# Patient Record
Sex: FEMALE | Race: WHITE | Employment: OTHER | URBAN - METROPOLITAN AREA
[De-identification: names, ages, dates, MRNs, and addresses within clinical notes are randomized per-mention and may not be internally consistent; named-entity substitution may affect disease eponyms.]

---

## 2022-07-13 ENCOUNTER — HOSPITAL ENCOUNTER (INPATIENT)
Age: 72
LOS: 6 days | Discharge: HOME HEALTH | DRG: 481 | End: 2022-07-19
Attending: EMERGENCY MEDICINE | Admitting: INTERNAL MEDICINE
Payer: MEDICARE

## 2022-07-13 ENCOUNTER — APPOINTMENT (OUTPATIENT)
Dept: GENERAL RADIOLOGY | Age: 72
DRG: 481 | End: 2022-07-13
Payer: MEDICARE

## 2022-07-13 DIAGNOSIS — S72.001A CLOSED FRACTURE OF RIGHT HIP, INITIAL ENCOUNTER (HCC): Primary | ICD-10-CM

## 2022-07-13 PROBLEM — E78.5 HLD (HYPERLIPIDEMIA): Status: ACTIVE | Noted: 2022-07-13

## 2022-07-13 PROBLEM — S72.141A INTERTROCHANTERIC FRACTURE OF RIGHT HIP, CLOSED, INITIAL ENCOUNTER (HCC): Status: ACTIVE | Noted: 2022-07-13

## 2022-07-13 PROBLEM — I10 HTN (HYPERTENSION): Status: ACTIVE | Noted: 2022-07-13

## 2022-07-13 LAB
ABO + RH BLD: NORMAL
ALBUMIN SERPL-MCNC: 3.7 G/DL (ref 3.2–4.6)
ALBUMIN/GLOB SERPL: 1 {RATIO} (ref 1.2–3.5)
ALP SERPL-CCNC: 69 U/L (ref 50–136)
ALT SERPL-CCNC: 19 U/L (ref 12–65)
ANION GAP SERPL CALC-SCNC: 8 MMOL/L (ref 7–16)
APPEARANCE UR: CLEAR
AST SERPL-CCNC: 26 U/L (ref 15–37)
BASOPHILS # BLD: 0 K/UL (ref 0–0.2)
BASOPHILS NFR BLD: 1 % (ref 0–2)
BILIRUB SERPL-MCNC: 0.3 MG/DL (ref 0.2–1.1)
BILIRUB UR QL: NEGATIVE
BLOOD GROUP ANTIBODIES SERPL: NORMAL
BUN SERPL-MCNC: 31 MG/DL (ref 8–23)
CALCIUM SERPL-MCNC: 9.2 MG/DL (ref 8.3–10.4)
CHLORIDE SERPL-SCNC: 110 MMOL/L (ref 98–107)
CO2 SERPL-SCNC: 27 MMOL/L (ref 21–32)
COLOR UR: NORMAL
CREAT SERPL-MCNC: 1.6 MG/DL (ref 0.6–1)
DIFFERENTIAL METHOD BLD: ABNORMAL
EKG ATRIAL RATE: 71 BPM
EKG DIAGNOSIS: NORMAL
EKG P AXIS: 49 DEGREES
EKG P-R INTERVAL: 148 MS
EKG Q-T INTERVAL: 418 MS
EKG QRS DURATION: 97 MS
EKG QTC CALCULATION (BAZETT): 451 MS
EKG R AXIS: 15 DEGREES
EKG T AXIS: 49 DEGREES
EKG VENTRICULAR RATE: 70 BPM
EOSINOPHIL # BLD: 0.1 K/UL (ref 0–0.8)
EOSINOPHIL NFR BLD: 3 % (ref 0.5–7.8)
ERYTHROCYTE [DISTWIDTH] IN BLOOD BY AUTOMATED COUNT: 13.4 % (ref 11.9–14.6)
GLOBULIN SER CALC-MCNC: 3.7 G/DL (ref 2.3–3.5)
GLUCOSE SERPL-MCNC: 122 MG/DL (ref 65–100)
GLUCOSE UR STRIP.AUTO-MCNC: NEGATIVE MG/DL
HCT VFR BLD AUTO: 37.5 % (ref 35.8–46.3)
HGB BLD-MCNC: 12.1 G/DL (ref 11.7–15.4)
HGB UR QL STRIP: NEGATIVE
IMM GRANULOCYTES # BLD AUTO: 0 K/UL (ref 0–0.5)
IMM GRANULOCYTES NFR BLD AUTO: 1 % (ref 0–5)
INR PPP: 0.9
KETONES UR QL STRIP.AUTO: NEGATIVE MG/DL
LEUKOCYTE ESTERASE UR QL STRIP.AUTO: NEGATIVE
LYMPHOCYTES # BLD: 0.5 K/UL (ref 0.5–4.6)
LYMPHOCYTES NFR BLD: 17 % (ref 13–44)
MCH RBC QN AUTO: 29.7 PG (ref 26.1–32.9)
MCHC RBC AUTO-ENTMCNC: 32.3 G/DL (ref 31.4–35)
MCV RBC AUTO: 92.1 FL (ref 79.6–97.8)
MONOCYTES # BLD: 0.3 K/UL (ref 0.1–1.3)
MONOCYTES NFR BLD: 9 % (ref 4–12)
NEUTS SEG # BLD: 2.2 K/UL (ref 1.7–8.2)
NEUTS SEG NFR BLD: 70 % (ref 43–78)
NITRITE UR QL STRIP.AUTO: NEGATIVE
NRBC # BLD: 0 K/UL (ref 0–0.2)
PH UR STRIP: 5.5 [PH] (ref 5–9)
PLATELET # BLD AUTO: 111 K/UL (ref 150–450)
PMV BLD AUTO: 11.1 FL (ref 9.4–12.3)
POTASSIUM SERPL-SCNC: 4.9 MMOL/L (ref 3.5–5.1)
PROT SERPL-MCNC: 7.4 G/DL (ref 6.3–8.2)
PROT UR STRIP-MCNC: NEGATIVE MG/DL
PROTHROMBIN TIME: 12.6 SEC (ref 12.6–14.5)
RBC # BLD AUTO: 4.07 M/UL (ref 4.05–5.2)
SODIUM SERPL-SCNC: 145 MMOL/L (ref 136–145)
SP GR UR REFRACTOMETRY: 1.02 (ref 1–1.02)
SPECIMEN EXP DATE BLD: NORMAL
UROBILINOGEN UR QL STRIP.AUTO: 0.2 EU/DL (ref 0.2–1)
WBC # BLD AUTO: 3.2 K/UL (ref 4.3–11.1)

## 2022-07-13 PROCEDURE — 86901 BLOOD TYPING SEROLOGIC RH(D): CPT

## 2022-07-13 PROCEDURE — 1100000000 HC RM PRIVATE

## 2022-07-13 PROCEDURE — 81003 URINALYSIS AUTO W/O SCOPE: CPT

## 2022-07-13 PROCEDURE — 80053 COMPREHEN METABOLIC PANEL: CPT

## 2022-07-13 PROCEDURE — 73502 X-RAY EXAM HIP UNI 2-3 VIEWS: CPT

## 2022-07-13 PROCEDURE — 51702 INSERT TEMP BLADDER CATH: CPT

## 2022-07-13 PROCEDURE — 99285 EMERGENCY DEPT VISIT HI MDM: CPT

## 2022-07-13 PROCEDURE — 85610 PROTHROMBIN TIME: CPT

## 2022-07-13 PROCEDURE — 85025 COMPLETE CBC W/AUTO DIFF WBC: CPT

## 2022-07-13 PROCEDURE — 73552 X-RAY EXAM OF FEMUR 2/>: CPT

## 2022-07-13 PROCEDURE — 6360000002 HC RX W HCPCS: Performed by: FAMILY MEDICINE

## 2022-07-13 PROCEDURE — 6370000000 HC RX 637 (ALT 250 FOR IP): Performed by: FAMILY MEDICINE

## 2022-07-13 PROCEDURE — 71045 X-RAY EXAM CHEST 1 VIEW: CPT

## 2022-07-13 PROCEDURE — 93005 ELECTROCARDIOGRAM TRACING: CPT | Performed by: EMERGENCY MEDICINE

## 2022-07-13 RX ORDER — MORPHINE SULFATE 2 MG/ML
1 INJECTION, SOLUTION INTRAMUSCULAR; INTRAVENOUS EVERY 4 HOURS PRN
Status: DISCONTINUED | OUTPATIENT
Start: 2022-07-13 | End: 2022-07-15

## 2022-07-13 RX ORDER — SODIUM CHLORIDE 9 MG/ML
INJECTION, SOLUTION INTRAVENOUS PRN
Status: DISCONTINUED | OUTPATIENT
Start: 2022-07-13 | End: 2022-07-15

## 2022-07-13 RX ORDER — OXYCODONE HYDROCHLORIDE 5 MG/1
5 TABLET ORAL EVERY 4 HOURS PRN
Status: DISCONTINUED | OUTPATIENT
Start: 2022-07-13 | End: 2022-07-19 | Stop reason: HOSPADM

## 2022-07-13 RX ORDER — ACETAMINOPHEN 325 MG/1
650 TABLET ORAL EVERY 6 HOURS PRN
Status: DISCONTINUED | OUTPATIENT
Start: 2022-07-13 | End: 2022-07-19 | Stop reason: HOSPADM

## 2022-07-13 RX ORDER — ONDANSETRON 2 MG/ML
4 INJECTION INTRAMUSCULAR; INTRAVENOUS EVERY 6 HOURS PRN
Status: DISCONTINUED | OUTPATIENT
Start: 2022-07-13 | End: 2022-07-19 | Stop reason: HOSPADM

## 2022-07-13 RX ORDER — SODIUM CHLORIDE 0.9 % (FLUSH) 0.9 %
5-40 SYRINGE (ML) INJECTION PRN
Status: DISCONTINUED | OUTPATIENT
Start: 2022-07-13 | End: 2022-07-15

## 2022-07-13 RX ORDER — SODIUM CHLORIDE 0.9 % (FLUSH) 0.9 %
5-40 SYRINGE (ML) INJECTION EVERY 12 HOURS SCHEDULED
Status: DISCONTINUED | OUTPATIENT
Start: 2022-07-13 | End: 2022-07-19 | Stop reason: HOSPADM

## 2022-07-13 RX ORDER — ONDANSETRON 4 MG/1
4 TABLET, ORALLY DISINTEGRATING ORAL EVERY 8 HOURS PRN
Status: DISCONTINUED | OUTPATIENT
Start: 2022-07-13 | End: 2022-07-19 | Stop reason: HOSPADM

## 2022-07-13 RX ORDER — ACETAMINOPHEN 650 MG/1
650 SUPPOSITORY RECTAL EVERY 6 HOURS PRN
Status: DISCONTINUED | OUTPATIENT
Start: 2022-07-13 | End: 2022-07-19 | Stop reason: HOSPADM

## 2022-07-13 RX ORDER — AMLODIPINE BESYLATE 10 MG/1
10 TABLET ORAL NIGHTLY
Status: DISCONTINUED | OUTPATIENT
Start: 2022-07-13 | End: 2022-07-19 | Stop reason: HOSPADM

## 2022-07-13 RX ORDER — ATORVASTATIN CALCIUM 40 MG/1
40 TABLET, FILM COATED ORAL NIGHTLY
Status: DISCONTINUED | OUTPATIENT
Start: 2022-07-13 | End: 2022-07-19 | Stop reason: HOSPADM

## 2022-07-13 RX ORDER — POLYETHYLENE GLYCOL 3350 17 G/17G
17 POWDER, FOR SOLUTION ORAL DAILY PRN
Status: DISCONTINUED | OUTPATIENT
Start: 2022-07-13 | End: 2022-07-19 | Stop reason: HOSPADM

## 2022-07-13 RX ADMIN — ACETAMINOPHEN 650 MG: 325 TABLET, FILM COATED ORAL at 20:13

## 2022-07-13 RX ADMIN — AMLODIPINE BESYLATE 10 MG: 10 TABLET ORAL at 20:13

## 2022-07-13 RX ADMIN — MORPHINE SULFATE 1 MG: 2 INJECTION, SOLUTION INTRAMUSCULAR; INTRAVENOUS at 14:38

## 2022-07-13 RX ADMIN — METOPROLOL TARTRATE 75 MG: 25 TABLET, FILM COATED ORAL at 20:13

## 2022-07-13 RX ADMIN — ATORVASTATIN CALCIUM 40 MG: 40 TABLET, FILM COATED ORAL at 20:13

## 2022-07-13 ASSESSMENT — PAIN SCALES - GENERAL
PAINLEVEL_OUTOF10: 10
PAINLEVEL_OUTOF10: 0
PAINLEVEL_OUTOF10: 10
PAINLEVEL_OUTOF10: 3
PAINLEVEL_OUTOF10: 10

## 2022-07-13 ASSESSMENT — PAIN DESCRIPTION - ORIENTATION
ORIENTATION: RIGHT

## 2022-07-13 ASSESSMENT — PAIN DESCRIPTION - FREQUENCY
FREQUENCY: INTERMITTENT
FREQUENCY: CONTINUOUS

## 2022-07-13 ASSESSMENT — PAIN DESCRIPTION - DESCRIPTORS
DESCRIPTORS: DISCOMFORT
DESCRIPTORS: SHARP

## 2022-07-13 ASSESSMENT — PAIN - FUNCTIONAL ASSESSMENT: PAIN_FUNCTIONAL_ASSESSMENT: 0-10

## 2022-07-13 ASSESSMENT — PAIN DESCRIPTION - LOCATION
LOCATION: HIP

## 2022-07-13 ASSESSMENT — PAIN DESCRIPTION - ONSET: ONSET: ON-GOING

## 2022-07-13 ASSESSMENT — PAIN DESCRIPTION - PAIN TYPE
TYPE: ACUTE PAIN
TYPE: ACUTE PAIN

## 2022-07-13 NOTE — ACP (ADVANCE CARE PLANNING)
[facility] Hospitalist Service  At the heart of better care     Advance Care Planning   Admit Date:  2022 10:25 AM   Name:  Sinai Morgan   Age:  66 y.o. Sex:  female  :  1950   MRN:  [id number]   Room:  [place] [address]    Sinai Morgan is able to make her own decisions:   Yes    Patient / surrogate decision-maker directed:  FULL    Patient or surrogate consented to discussion of the current conditions, workup, management plans, prognosis, and understand the risk for further deterioration. Time spent: 17 minutes in direct discussion (face to face and/or over phone).       Signed:  Jamila Orlando DO
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
## 2022-07-13 NOTE — ED NOTES
Called [facility] according to staff patients creatinine was 1.25 on 5/27/22.       Sandra Gr RN  07/13/22 6008

## 2022-07-13 NOTE — H&P
Hospitalist Admission History and Physical         NAME:            Rey Lyon    Age:                66 y.o.    :               1950    MRN:              [id number]    PCP: Lewis Quiroz MD    Consulting MD:    Treatment Team: Attending Provider: Livia Bennett DO; Registered Nurse: Silva Raya RN; Orthopedic Surgeon: Nabil Cadet MD         Chief Complaint   Patient presents with    Hip Pain   HPI:    Patient is a 66 y.o. female who presented to the ED for cc right hip pain after a fall unable to bear weight. Injury occurred early this AM. Denies syncope or dizziness. I have no outside history of patient since she states most of her care is from [facility]. She states she has been diagnosed with HTN, HLD, and CKD stage 3. Social hx - Denies tobacco or ETOH use. Labs - creatine 1.6. I do not have baseline. Right hip x ray - Comminuted right intertrochanteric femoral neck fracture    Family history reviewed and negative except as noted above.     Surgical hx of     Social History     Social History Narrative    Not on file            Social History     Tobacco Use    Smoking status: Not on file    Smokeless tobacco: Not on file   Substance Use Topics    Alcohol use: Not on file            Social History     Substance and Sexual Activity   Drug Use Not on file                 Allergies   Allergen Reactions    Red Dye Itching    Yellow Dyes (Non-Tartrazine) Itching            Prior to Admission medications    Not on File                      Review of Systems         Constitutional:NAD    Eyes:  no change in visual acuity, no photophobia    Ears, nose, mouth, throat, and face: no  Odynphagia, dysphagia, no thrush or exudate, negative for chronic sinus congestion, recurrent headaches    Respiratory: negative for SOB, hemoptysis or cough    Cardiovascular: negative for CP, palpitations, or PND    Gastrointestinal: negative for abdominal pain, no hematemesis, hematochezia or BRBPR    Genitourinary: no urgency, frequency, or dysuria, no nocturia    Integument/breast: negative for skin rash or skin lesions    Hematologic/lymphatic: negative for known bleeding disorder    Musculoskeletal- Right hip pain and unable to bear weight    Neurological: negative for lightheadedness, syncope or presyncopal events, no seizure or CVA history    Behavioral/Psych: negative for depression or chronic anxiety,    Endocrine: negative for polydyspia, polyuria or intolerance to heat or cold    Allergic/Immunologic: negative for chronic allergic rhinitis, or known connective tissue disorder              Objective:         Patient Vitals for the past 24 hrs:   Temp Pulse Resp BP SpO2   07/13/22 1029 98.1 °F (36.7 °C) 67 18 (!) 144/70 96 %            No intake/output data recorded. No intake/output data recorded.          Data Review:   Recent Results (from the past 24 hour(s))   CBC with Auto Differential    Collection Time: 07/13/22 11:16 AM   Result Value Ref Range    WBC 3.2 (L) 4.3 - 11.1 K/uL    RBC 4.07 4.05 - 5.2 M/uL    Hemoglobin 12.1 11.7 - 15.4 g/dL    Hematocrit 37.5 35.8 - 46.3 %    MCV 92.1 79.6 - 97.8 FL    MCH 29.7 26.1 - 32.9 PG    MCHC 32.3 31.4 - 35.0 g/dL    RDW 13.4 11.9 - 14.6 %    Platelets 204 (L) 674 - 450 K/uL    MPV 11.1 9.4 - 12.3 FL    nRBC 0.00 0.0 - 0.2 K/uL    Differential Type AUTOMATED      Seg Neutrophils 70 43 - 78 %    Lymphocytes 17 13 - 44 %    Monocytes 9 4.0 - 12.0 %    Eosinophils % 3 0.5 - 7.8 %    Basophils 1 0.0 - 2.0 %    Immature Granulocytes 1 0.0 - 5.0 %    Segs Absolute 2.2 1.7 - 8.2 K/UL    Absolute Lymph # 0.5 0.5 - 4.6 K/UL    Absolute Mono # 0.3 0.1 - 1.3 K/UL    Absolute Eos # 0.1 0.0 - 0.8 K/UL    Basophils Absolute 0.0 0.0 - 0.2 K/UL    Absolute Immature Granulocyte 0.0 0.0 - 0.5 K/UL   CMP    Collection Time: 07/13/22 11:16 AM   Result Value Ref Range    Sodium 145 136 - 145 mmol/L    Potassium 4.9 3.5 - 5.1 mmol/L    Chloride 110 (H) 98 - 107 mmol/L    CO2 27 21 - 32 mmol/L    Anion Gap 8 7 - 16 mmol/L    Glucose 122 (H) 65 - 100 mg/dL    BUN 31 (H) 8 - 23 MG/DL    CREATININE 1.60 (H) 0.6 - 1.0 MG/DL    GFR  41 (L) >60 ml/min/1.73m2    GFR Non- 34 (L) >60 ml/min/1.73m2    Calcium 9.2 8.3 - 10.4 MG/DL    Total Bilirubin 0.3 0.2 - 1.1 MG/DL    ALT 19 12 - 65 U/L    AST 26 15 - 37 U/L    Alk Phosphatase 69 50 - 136 U/L    Total Protein 7.4 6.3 - 8.2 g/dL    Albumin 3.7 3.2 - 4.6 g/dL    Globulin 3.7 (H) 2.3 - 3.5 g/dL    Albumin/Globulin Ratio 1.0 (L) 1.2 - 3.5     Protime-INR    Collection Time: 07/13/22 11:16 AM   Result Value Ref Range    Protime 12.6 12.6 - 14.5 sec    INR 0.9              Physical Exam:         General:    Alert, cooperative, no distress, appears stated age. Eyes:    Conjunctivae/corneas clear. PERRL    Ears:    Normal     Nose:    Nares normal.     Mouth/Throat:    Wearing mask    Neck:     no JVD. Back:     deferred    Lungs:     Clear to auscultation bilaterally. Heart:    Regular rate and rhythm, S1, S2 normal    Abdomen:     Soft, non-tender. Bowel sounds normal. No masses,  No organomegaly. Extremities:    Right external rotation of right leg with good sensation. Right leg shorter than left. Skin:    Skin color, texture, turgor normal. No rashes or lesions    Neurologic:    No obvious deficit other than limited ROM of right leg. Assessment and Plan         Principal Problem:    Intertrochanteric fracture of right hip, closed, initial encounter ([facility] [address] [facility]  Active Problems:    HTN (hypertension)    HLD (hyperlipidemia)  Resolved Problems:    * No resolved hospital problems. *    Right intertrochanteric fracture - Consult ortho. Type and screen. At moderate risk for surgical and post surgical complications. HTN- amlodipine, BB.  Holding HCTZ and ACE    HLD - statin    CKD stage 3? - I do not have a baseline creatine on her, does not appear dry on exam. Holding her HCTZ

## 2022-07-13 NOTE — ED NOTES
Patient readjusted in bed. [place] tray given to patient with water.       Papa Bush RN  07/13/22 4055

## 2022-07-13 NOTE — ED NOTES
Report given to [facility], transfer of care at this time.       Francis López RN  07/13/22 0466

## 2022-07-14 ENCOUNTER — ANESTHESIA EVENT (OUTPATIENT)
Dept: SURGERY | Age: 72
DRG: 481 | End: 2022-07-14
Payer: MEDICARE

## 2022-07-14 PROBLEM — N18.31 STAGE 3A CHRONIC KIDNEY DISEASE (HCC): Chronic | Status: ACTIVE | Noted: 2022-07-14

## 2022-07-14 PROBLEM — E78.5 HLD (HYPERLIPIDEMIA): Chronic | Status: ACTIVE | Noted: 2022-07-13

## 2022-07-14 PROBLEM — I10 HTN (HYPERTENSION): Chronic | Status: ACTIVE | Noted: 2022-07-13

## 2022-07-14 PROBLEM — M10.9 GOUT: Status: ACTIVE | Noted: 2017-11-01

## 2022-07-14 LAB
ANION GAP SERPL CALC-SCNC: 2 MMOL/L (ref 7–16)
BASOPHILS # BLD: 0 K/UL (ref 0–0.2)
BASOPHILS NFR BLD: 0 % (ref 0–2)
BUN SERPL-MCNC: 27 MG/DL (ref 8–23)
CALCIUM SERPL-MCNC: 8.7 MG/DL (ref 8.3–10.4)
CHLORIDE SERPL-SCNC: 108 MMOL/L (ref 98–107)
CO2 SERPL-SCNC: 29 MMOL/L (ref 21–32)
CREAT SERPL-MCNC: 1.2 MG/DL (ref 0.6–1)
DIFFERENTIAL METHOD BLD: ABNORMAL
EOSINOPHIL # BLD: 0.1 K/UL (ref 0–0.8)
EOSINOPHIL NFR BLD: 2 % (ref 0.5–7.8)
ERYTHROCYTE [DISTWIDTH] IN BLOOD BY AUTOMATED COUNT: 13.4 % (ref 11.9–14.6)
GLUCOSE SERPL-MCNC: 97 MG/DL (ref 65–100)
HCT VFR BLD AUTO: 30.4 % (ref 35.8–46.3)
HGB BLD-MCNC: 9.9 G/DL (ref 11.7–15.4)
IMM GRANULOCYTES # BLD AUTO: 0 K/UL (ref 0–0.5)
IMM GRANULOCYTES NFR BLD AUTO: 1 % (ref 0–5)
LYMPHOCYTES # BLD: 0.7 K/UL (ref 0.5–4.6)
LYMPHOCYTES NFR BLD: 18 % (ref 13–44)
MCH RBC QN AUTO: 29.6 PG (ref 26.1–32.9)
MCHC RBC AUTO-ENTMCNC: 32.6 G/DL (ref 31.4–35)
MCV RBC AUTO: 90.7 FL (ref 79.6–97.8)
MONOCYTES # BLD: 0.6 K/UL (ref 0.1–1.3)
MONOCYTES NFR BLD: 16 % (ref 4–12)
NEUTS SEG # BLD: 2.6 K/UL (ref 1.7–8.2)
NEUTS SEG NFR BLD: 64 % (ref 43–78)
NRBC # BLD: 0 K/UL (ref 0–0.2)
PLATELET # BLD AUTO: 98 K/UL (ref 150–450)
PMV BLD AUTO: 11.4 FL (ref 9.4–12.3)
POTASSIUM SERPL-SCNC: 3.9 MMOL/L (ref 3.5–5.1)
RBC # BLD AUTO: 3.35 M/UL (ref 4.05–5.2)
SODIUM SERPL-SCNC: 139 MMOL/L (ref 136–145)
WBC # BLD AUTO: 4 K/UL (ref 4.3–11.1)

## 2022-07-14 PROCEDURE — 1100000000 HC RM PRIVATE

## 2022-07-14 PROCEDURE — 99232 SBSQ HOSP IP/OBS MODERATE 35: CPT | Performed by: ORTHOPAEDIC SURGERY

## 2022-07-14 PROCEDURE — 2580000003 HC RX 258: Performed by: FAMILY MEDICINE

## 2022-07-14 PROCEDURE — 85025 COMPLETE CBC W/AUTO DIFF WBC: CPT

## 2022-07-14 PROCEDURE — 6360000002 HC RX W HCPCS: Performed by: FAMILY MEDICINE

## 2022-07-14 PROCEDURE — 6370000000 HC RX 637 (ALT 250 FOR IP): Performed by: FAMILY MEDICINE

## 2022-07-14 PROCEDURE — 80048 BASIC METABOLIC PNL TOTAL CA: CPT

## 2022-07-14 PROCEDURE — 36415 COLL VENOUS BLD VENIPUNCTURE: CPT

## 2022-07-14 RX ADMIN — ATORVASTATIN CALCIUM 40 MG: 40 TABLET, FILM COATED ORAL at 20:35

## 2022-07-14 RX ADMIN — SODIUM CHLORIDE, PRESERVATIVE FREE 10 ML: 5 INJECTION INTRAVENOUS at 09:45

## 2022-07-14 RX ADMIN — METOPROLOL TARTRATE 75 MG: 25 TABLET, FILM COATED ORAL at 20:35

## 2022-07-14 RX ADMIN — MORPHINE SULFATE 1 MG: 2 INJECTION, SOLUTION INTRAMUSCULAR; INTRAVENOUS at 23:33

## 2022-07-14 RX ADMIN — METOPROLOL TARTRATE 75 MG: 25 TABLET, FILM COATED ORAL at 08:27

## 2022-07-14 RX ADMIN — SODIUM CHLORIDE, PRESERVATIVE FREE 10 ML: 5 INJECTION INTRAVENOUS at 00:00

## 2022-07-14 RX ADMIN — MORPHINE SULFATE 1 MG: 2 INJECTION, SOLUTION INTRAMUSCULAR; INTRAVENOUS at 14:49

## 2022-07-14 RX ADMIN — AMLODIPINE BESYLATE 10 MG: 10 TABLET ORAL at 20:34

## 2022-07-14 RX ADMIN — MORPHINE SULFATE 1 MG: 2 INJECTION, SOLUTION INTRAMUSCULAR; INTRAVENOUS at 07:21

## 2022-07-14 RX ADMIN — SODIUM CHLORIDE, PRESERVATIVE FREE 10 ML: 5 INJECTION INTRAVENOUS at 20:35

## 2022-07-14 ASSESSMENT — PAIN - FUNCTIONAL ASSESSMENT
PAIN_FUNCTIONAL_ASSESSMENT: PREVENTS OR INTERFERES WITH ALL ACTIVE AND SOME PASSIVE ACTIVITIES
PAIN_FUNCTIONAL_ASSESSMENT: INTOLERABLE, UNABLE TO DO ANY ACTIVE OR PASSIVE ACTIVITIES
PAIN_FUNCTIONAL_ASSESSMENT: PREVENTS OR INTERFERES WITH MANY ACTIVE NOT PASSIVE ACTIVITIES

## 2022-07-14 ASSESSMENT — PAIN DESCRIPTION - PAIN TYPE
TYPE: ACUTE PAIN
TYPE: ACUTE PAIN

## 2022-07-14 ASSESSMENT — PAIN DESCRIPTION - FREQUENCY
FREQUENCY: INTERMITTENT
FREQUENCY: INTERMITTENT

## 2022-07-14 ASSESSMENT — PAIN DESCRIPTION - ONSET
ONSET: ON-GOING
ONSET: ON-GOING

## 2022-07-14 ASSESSMENT — PAIN DESCRIPTION - LOCATION
LOCATION: HIP

## 2022-07-14 ASSESSMENT — PAIN DESCRIPTION - DESCRIPTORS
DESCRIPTORS: ACHING;DULL
DESCRIPTORS: SHARP;STABBING
DESCRIPTORS: ACHING;SHARP

## 2022-07-14 ASSESSMENT — PAIN SCALES - GENERAL
PAINLEVEL_OUTOF10: 10
PAINLEVEL_OUTOF10: 0
PAINLEVEL_OUTOF10: 10
PAINLEVEL_OUTOF10: 5
PAINLEVEL_OUTOF10: 3
PAINLEVEL_OUTOF10: 3
PAINLEVEL_OUTOF10: 10

## 2022-07-14 ASSESSMENT — PAIN DESCRIPTION - ORIENTATION
ORIENTATION: RIGHT

## 2022-07-14 NOTE — PROGRESS NOTES
TRANSFER - OUT REPORT:    Verbal report given to Annmarie Robles on Jessica Junior  being transferred to 8th Floor ([address] [phone]) for routine progression of patient care       Report consisted of patient's Situation, Background, Assessment and   Recommendations(SBAR). Information from the following report(s) Nurse Handoff Report was reviewed with the receiving nurse. Lines:   Peripheral IV 07/13/22 Left Antecubital (Active)       Peripheral IV 07/13/22 Right Antecubital (Active)        Opportunity for questions and clarification was provided.       Patient transported with:  [employer]

## 2022-07-14 NOTE — PROGRESS NOTES
Spoke with pre-op and Dr. Otto Jansen has cancelled the surgery for today and is rescheduling for 0730 in the AM with a  of 0515. Will let patient know. And consents are signed by patient.

## 2022-07-14 NOTE — PLAN OF CARE
Problem: Pain  Goal: Verbalizes/displays adequate comfort level or baseline comfort level  Outcome: Progressing  Flowsheets (Taken 7/14/2022 0811)  Verbalizes/displays adequate comfort level or baseline comfort level: Encourage patient to monitor pain and request assistance     Problem: Discharge Planning  Goal: Discharge to home or other facility with appropriate resources  Recent Flowsheet Documentation  Taken 7/14/2022 0811 by Kathryn Padilla RN  Discharge to home or other facility with appropriate resources: Identify barriers to discharge with patient and caregiver

## 2022-07-14 NOTE — PROGRESS NOTES
Patient resting in bed with no distress noted.  Call light in reach and will prepare bedside shift report for oncoming nurse

## 2022-07-14 NOTE — PROGRESS NOTES
Hospitalist Progress Note   Admit Date:  2022 10:25 AM   Name:  Leela Jane   Age:  66 y.o. Sex:  female  :  1950   MRN:  [id number]   Room:  819/    Presenting Complaint: Hip Pain     Reason(s) for Admission: Closed fracture of right hip, initial encounter [facility]) [S72.001A]  Intertrochanteric fracture of right hip, closed, initial encounter [facility]) [facility] Course & Interval History:   Patient is a 66 y.o. female with hx of  HTN, HLD, and CKD stage 3,who presented to the ED for cc right hip pain after a fall unable to bear weight. admitted for R hip fracture. Subjective/24hr Events (22): Pt reports R hip pain mild intermittent when she is still, severe when moving. No CP, SOB. Consents to blood if needed, says she has had before. I have discussed with the patient the rationale for blood component transfusion; its benefits in treating or preventing fatigue, organ damage, or death; and its risk which includes mild transfusion reactions, rare risk of blood borne infection, or more serious but rare reactions. I have discussed the alternatives to transfusion, including the risk and consequences of not receiving transfusion. The patient had an opportunity to ask questions and had agreed to proceed with transfusion of blood components. Assessment & Plan:       [facility]chanter[facility] of right hip, closed, [facility]  -to OR today  -recheck CBC in AM      Anemia   -check iron studies in AM      HTN (hypertension)  -cont metoprolol, norvasc      HLD (hyperlipidemia)  -cont lipitor      Stage 3a chronic kidney disease (HCC)  -BMP at baseline, recheck in AM      Discharge Planning:       To STR 2 nights after surgery    Diet:  Diet NPO  DVT PPx: per ortho  Code status: Full Code    Hospital Problems:  Principal Problem:    Intertrochanteric fracture of right hip, closed, initial encounter ([facility] [address]  Active Problems:    HTN (hypertension)    HLD (hyperlipidemia)    Stage 3a chronic kidney disease ([facility] [address]  Resolved Problems:    * No resolved hospital problems. *      Objective:     Patient Vitals for the past 24 hrs:   Temp Pulse Resp BP SpO2   07/14/22 0811 99.1 °F (37.3 °C) 71 17 126/63 92 %   07/14/22 0721 -- -- 21 -- --   07/14/22 0306 98.1 °F (36.7 °C) 63 16 125/67 97 %   07/13/22 2220 99 °F (37.2 °C) 68 16 130/72 98 %   07/13/22 2200 -- 67 17 127/61 94 %   07/13/22 2130 -- 67 17 111/62 95 %   07/13/22 2119 99 °F (37.2 °C) 69 13 112/77 95 %   07/13/22 2018 -- 79 12 134/62 --   07/13/22 2015 -- 75 18 134/62 96 %   07/13/22 1915 -- 78 19 (!) 141/68 --   07/13/22 1715 -- 68 -- 125/65 94 %   07/13/22 1645 -- 58 -- 128/63 94 %   07/13/22 1615 -- 65 12 125/60 94 %   07/13/22 1545 -- 61 16 124/62 96 %   07/13/22 1515 -- 69 12 130/61 --   07/13/22 1029 98.1 °F (36.7 °C) 67 18 (!) 144/70 96 %       Oxygen Therapy  SpO2: 92 %  Pulse Oximetry Type: Continuous  Pulse via Oximetry: 67 beats per minute  Pulse Oximeter Device Mode: Continuous  Pulse Oximeter Device Location: Finger,Left  O2 Device: None (Room air)  Oxygen Therapy: None (Room air)    Estimated body mass index is 26.04 kg/m² as calculated from the following:    Height as of this encounter: 5' 3\" (1.6 m). Weight as of this encounter: 147 lb (66.7 kg). Intake/Output Summary (Last 24 hours) at 7/14/2022 0841  Last data filed at 7/14/2022 0630  Gross per 24 hour   Intake --   Output 1000 ml   Net -1000 ml         Physical Exam:     Blood pressure 126/63, pulse 71, temperature 99.1 °F (37.3 °C), resp. rate 17, height 5' 3\" (1.6 m), weight 147 lb (66.7 kg), SpO2 92 %. General:    Well nourished. Head:  Normocephalic, atraumatic  Eyes:  Sclerae appear normal.  Pupils equally round. ENT:  Nares appear normal, no drainage. Moist oral mucosa  Neck:  No restricted ROM. Trachea midline   CV:   RRR. No jugular venous distension. Lungs:   Symmetric expansion. Abdomen:   nondistended.   Extremities: No Collection Time: 07/13/22 11:16 AM   Result Value Ref Range    Protime 12.6 12.6 - 14.5 sec    INR 0.9     EKG 12 Lead    Collection Time: 07/13/22 12:59 PM   Result Value Ref Range    Ventricular Rate 70 BPM    Atrial Rate 71 BPM    P-R Interval 148 ms    QRS Duration 97 ms    Q-T Interval 418 ms    QTc Calculation (Bazett) 451 ms    P Axis 49 degrees    R Axis 15 degrees    T Axis 49 degrees    Diagnosis Sinus rhythm    TYPE AND SCREEN    Collection Time: 07/13/22  2:40 PM   Result Value Ref Range    Crossmatch expiration date 07/16/2022,2359     ABO/Rh O POSITIVE     Antibody Screen NEG    Urinalysis w rflx microscopic    Collection Time: 07/13/22  2:55 PM   Result Value Ref Range    Color, UA YELLOW/STRAW      Appearance CLEAR      Specific Gravity, UA 1.016 1.001 - 1.023      pH, Urine 5.5 5.0 - 9.0      Protein, UA Negative NEG mg/dL    Glucose, UA Negative mg/dL    Ketones, Urine Negative NEG mg/dL    Bilirubin Urine Negative NEG      Blood, Urine Negative NEG      Urobilinogen, Urine 0.2 0.2 - 1.0 EU/dL    Nitrite, Urine Negative NEG      Leukocyte Esterase, Urine Negative NEG     Basic Metabolic Panel w/ Reflex to MG    Collection Time: 07/14/22  4:38 AM   Result Value Ref Range    Sodium 139 136 - 145 mmol/L    Potassium 3.9 3.5 - 5.1 mmol/L    Chloride 108 (H) 98 - 107 mmol/L    CO2 29 21 - 32 mmol/L    Anion Gap 2 (L) 7 - 16 mmol/L    Glucose 97 65 - 100 mg/dL    BUN 27 (H) 8 - 23 MG/DL    CREATININE 1.20 (H) 0.6 - 1.0 MG/DL    GFR  57 (L) >60 ml/min/1.73m2    GFR Non- 47 (L) >60 ml/min/1.73m2    Calcium 8.7 8.3 - 10.4 MG/DL   CBC with Auto Differential    Collection Time: 07/14/22  4:38 AM   Result Value Ref Range    WBC 4.0 (L) 4.3 - 11.1 K/uL    RBC 3.35 (L) 4.05 - 5.2 M/uL    Hemoglobin 9.9 (L) 11.7 - 15.4 g/dL    Hematocrit 30.4 (L) 35.8 - 46.3 %    MCV 90.7 79.6 - 97.8 FL    MCH 29.6 26.1 - 32.9 PG    MCHC 32.6 31.4 - 35.0 g/dL    RDW 13.4 11.9 - 14.6 % Platelets 98 (L) 434 - 450 K/uL    MPV 11.4 9.4 - 12.3 FL    nRBC 0.00 0.0 - 0.2 K/uL    Differential Type AUTOMATED      Seg Neutrophils 64 43 - 78 %    Lymphocytes 18 13 - 44 %    Monocytes 16 (H) 4.0 - 12.0 %    Eosinophils % 2 0.5 - 7.8 %    Basophils 0 0.0 - 2.0 %    Immature Granulocytes 1 0.0 - 5.0 %    Segs Absolute 2.6 1.7 - 8.2 K/UL    Absolute Lymph # 0.7 0.5 - 4.6 K/UL    Absolute Mono # 0.6 0.1 - 1.3 K/UL    Absolute Eos # 0.1 0.0 - 0.8 K/UL    Basophils Absolute 0.0 0.0 - 0.2 K/UL    Absolute Immature Granulocyte 0.0 0.0 - 0.5 K/UL       Other Studies:  XR HIP RIGHT (2-3 VIEWS)   Final Result      1. Comminuted right intertrochanteric femoral neck fracture. XR FEMUR RIGHT (MIN 2 VIEWS)   Final Result      1. Intertrochanteric right femoral neck fracture. XR CHEST PORTABLE   Final Result      1. No acute cardiopulmonary process.       CPT code(s) 26476                      Current Meds:  Current Facility-Administered Medications   Medication Dose Route Frequency    ceFAZolin (ANCEF) 2000 mg in sterile water 20 mL IV syringe  2,000 mg IntraVENous On Call to OR    atorvastatin (LIPITOR) tablet 40 mg  40 mg Oral Nightly    amLODIPine (NORVASC) tablet 10 mg  10 mg Oral Nightly    metoprolol tartrate (LOPRESSOR) tablet 75 mg  75 mg Oral BID    sodium chloride flush 0.9 % injection 5-40 mL  5-40 mL IntraVENous 2 times per day    sodium chloride flush 0.9 % injection 5-40 mL  5-40 mL IntraVENous PRN    0.9 % sodium chloride infusion   IntraVENous PRN    ondansetron (ZOFRAN-ODT) disintegrating tablet 4 mg  4 mg Oral Q8H PRN    Or    ondansetron (ZOFRAN) injection 4 mg  4 mg IntraVENous Q6H PRN    polyethylene glycol (GLYCOLAX) packet 17 g  17 g Oral Daily PRN    acetaminophen (TYLENOL) tablet 650 mg  650 mg Oral Q6H PRN    Or    acetaminophen (TYLENOL) suppository 650 mg  650 mg Rectal Q6H PRN    morphine injection 1 mg  1 mg IntraVENous Q4H PRN    oxyCODONE (ROXICODONE) immediate release tablet 5 mg  5 mg Oral Q4H PRN    tuberculin injection 5 Units  5 Units IntraDERmal Once       Signed:  Josselyn Friedman MD    Part of this note may have been written by using a voice dictation software. The note has been proof read but may still contain some grammatical/other typographical errors.
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
## 2022-07-14 NOTE — PROGRESS NOTES
TRANSFER - IN REPORT:    Verbal report received from Javier Lima RN  on [address]  being received from ER for routine progression of patient care      Report consisted of patient's Situation, Background, Assessment and   Recommendations(SBAR). Information from the following report(s) Nurse Handoff Report, ED SBAR, Intake/Output, MAR and Recent Results was reviewed with the receiving nurse. Opportunity for questions and clarification was provided. Assessment completed upon patient's arrival to unit and care assumed.

## 2022-07-14 NOTE — PROGRESS NOTES
Patient admitted to room 819 from ER due to pain to right hip from a fall earlier today. Patient has closed fracture to right hip. Patient is alert and oriented x 4 with no pain or distress at this time. Patient has glasses and hearing is within normal limits. Patient has upper and lower dentures. Patient on RA with RR even/unlabored. Patient heart sounds are S1 and S2 with regular rhythm and heart rate at 60. Patient has pérez cath with yellow clear urine output draining. Patient will be NPO after midnight. Abdomen is soft and non-tender with active bowel sounds in all quads and LBM this morning. Safety in place with call light in reach .

## 2022-07-14 NOTE — PROGRESS NOTES
Patient laying bed c/o right hip pain. No other issues at this time. Call light within reach. Awaiting to see when surgery is going to be.

## 2022-07-14 NOTE — CARE COORDINATION
MSN, CM:  spoke with patient this AM about discharge planning. Patient lives with her  in own home and son lives bedside them in his own home. Patient was totally independent prior to fall. Patient request [facility] for discharge plans. Patient to have surgery today, will evaluate tomorrow after PT/OT. Case Management will continue to follow for any discharge needs. 07/14/22 0912   Service Assessment   Patient Orientation Alert and Oriented   Cognition Alert   History Provided By Patient   Primary [facility] is: Legal Next of Kin  (Jesus Rick - )   PCP Verified by CM Yes   Last Visit to PCP Within last 3 months   Prior Functional Level Independent in ADLs/IADLs   Current Functional Level Independent in ADLs/IADLs   Can patient return to prior living arrangement Yes   Ability to make needs known: Good   Family able to assist with home care needs: Yes   Would you like for me to discuss the discharge plan with any other family members/significant others, and if so, who?  Yes  (Holger - )   Financial Resources Medicare   Social/Functional History   Lives With Spouse   Type of [address] One level   Home Access Level entry   [address] unit   [address] [facility] Responsibilities Yes   Meal Prep Responsibility Primary   [address] Paying/Finance Responsibility [address] [place] Management Primary   Ambulation Assistance Independent   Transfer Assistance Independent   Active  No   Patient's [address] -  [facility]   Mode of Transportation Truck   Occupation Retired   Discharge Planning   Type of [address] [facility] Living Arrangements Spouse/Significant Other   Current Services Prior To Admission None   DME Ordered? No   Potential Assistance Purchasing Medications No   Type of Home Care Services None   Patient expects to be discharged to: House   One/Two Story Residence One story   History of falls?  1

## 2022-07-15 ENCOUNTER — APPOINTMENT (OUTPATIENT)
Dept: GENERAL RADIOLOGY | Age: 72
DRG: 481 | End: 2022-07-15
Payer: MEDICARE

## 2022-07-15 ENCOUNTER — ANESTHESIA (OUTPATIENT)
Dept: SURGERY | Age: 72
DRG: 481 | End: 2022-07-15
Payer: MEDICARE

## 2022-07-15 LAB
ANION GAP SERPL CALC-SCNC: 3 MMOL/L (ref 7–16)
BUN SERPL-MCNC: 33 MG/DL (ref 8–23)
CALCIUM SERPL-MCNC: 8.9 MG/DL (ref 8.3–10.4)
CHLORIDE SERPL-SCNC: 106 MMOL/L (ref 98–107)
CO2 SERPL-SCNC: 28 MMOL/L (ref 21–32)
CREAT SERPL-MCNC: 1.3 MG/DL (ref 0.6–1)
ERYTHROCYTE [DISTWIDTH] IN BLOOD BY AUTOMATED COUNT: 13.4 % (ref 11.9–14.6)
FERRITIN SERPL-MCNC: 534 NG/ML (ref 8–388)
GLUCOSE SERPL-MCNC: 112 MG/DL (ref 65–100)
HCT VFR BLD AUTO: 31.7 % (ref 35.8–46.3)
HGB BLD-MCNC: 10.3 G/DL (ref 11.7–15.4)
IRON SATN MFR SERPL: 10 %
IRON SERPL-MCNC: 28 UG/DL (ref 35–150)
IRON SERPL-MCNC: 29 UG/DL (ref 35–150)
MCH RBC QN AUTO: 29.8 PG (ref 26.1–32.9)
MCHC RBC AUTO-ENTMCNC: 32.5 G/DL (ref 31.4–35)
MCV RBC AUTO: 91.6 FL (ref 79.6–97.8)
NRBC # BLD: 0 K/UL (ref 0–0.2)
PLATELET # BLD AUTO: 109 K/UL (ref 150–450)
PMV BLD AUTO: 11.1 FL (ref 9.4–12.3)
POTASSIUM SERPL-SCNC: 4 MMOL/L (ref 3.5–5.1)
RBC # BLD AUTO: 3.46 M/UL (ref 4.05–5.2)
SODIUM SERPL-SCNC: 137 MMOL/L (ref 136–145)
TIBC SERPL-MCNC: 294 UG/DL (ref 250–450)
WBC # BLD AUTO: 4.8 K/UL (ref 4.3–11.1)

## 2022-07-15 PROCEDURE — 83540 ASSAY OF IRON: CPT

## 2022-07-15 PROCEDURE — 0QS606Z REPOSITION RIGHT UPPER FEMUR WITH INTRAMEDULLARY INTERNAL FIXATION DEVICE, OPEN APPROACH: ICD-10-PCS | Performed by: ORTHOPAEDIC SURGERY

## 2022-07-15 PROCEDURE — 80048 BASIC METABOLIC PNL TOTAL CA: CPT

## 2022-07-15 PROCEDURE — 2580000003 HC RX 258: Performed by: ORTHOPAEDIC SURGERY

## 2022-07-15 PROCEDURE — 2500000003 HC RX 250 WO HCPCS: Performed by: ORTHOPAEDIC SURGERY

## 2022-07-15 PROCEDURE — 3600000014 HC SURGERY LEVEL 4 ADDTL 15MIN: Performed by: ORTHOPAEDIC SURGERY

## 2022-07-15 PROCEDURE — 2500000003 HC RX 250 WO HCPCS: Performed by: NURSE ANESTHETIST, CERTIFIED REGISTERED

## 2022-07-15 PROCEDURE — 2580000003 HC RX 258: Performed by: FAMILY MEDICINE

## 2022-07-15 PROCEDURE — 6360000002 HC RX W HCPCS: Performed by: NURSE ANESTHETIST, CERTIFIED REGISTERED

## 2022-07-15 PROCEDURE — 2580000003 HC RX 258: Performed by: ANESTHESIOLOGY

## 2022-07-15 PROCEDURE — 3700000000 HC ANESTHESIA ATTENDED CARE: Performed by: ORTHOPAEDIC SURGERY

## 2022-07-15 PROCEDURE — C1769 GUIDE WIRE: HCPCS | Performed by: ORTHOPAEDIC SURGERY

## 2022-07-15 PROCEDURE — 73502 X-RAY EXAM HIP UNI 2-3 VIEWS: CPT

## 2022-07-15 PROCEDURE — 36415 COLL VENOUS BLD VENIPUNCTURE: CPT

## 2022-07-15 PROCEDURE — 6370000000 HC RX 637 (ALT 250 FOR IP): Performed by: ORTHOPAEDIC SURGERY

## 2022-07-15 PROCEDURE — 6360000002 HC RX W HCPCS: Performed by: ORTHOPAEDIC SURGERY

## 2022-07-15 PROCEDURE — 3600000004 HC SURGERY LEVEL 4 BASE: Performed by: ORTHOPAEDIC SURGERY

## 2022-07-15 PROCEDURE — 85027 COMPLETE CBC AUTOMATED: CPT

## 2022-07-15 PROCEDURE — 1100000000 HC RM PRIVATE

## 2022-07-15 PROCEDURE — 2709999900 HC NON-CHARGEABLE SUPPLY: Performed by: ORTHOPAEDIC SURGERY

## 2022-07-15 PROCEDURE — 3700000001 HC ADD 15 MINUTES (ANESTHESIA): Performed by: ORTHOPAEDIC SURGERY

## 2022-07-15 PROCEDURE — C1713 ANCHOR/SCREW BN/BN,TIS/BN: HCPCS | Performed by: ORTHOPAEDIC SURGERY

## 2022-07-15 PROCEDURE — 82728 ASSAY OF FERRITIN: CPT

## 2022-07-15 PROCEDURE — 7100000001 HC PACU RECOVERY - ADDTL 15 MIN: Performed by: ORTHOPAEDIC SURGERY

## 2022-07-15 PROCEDURE — 27245 TREAT THIGH FRACTURE: CPT | Performed by: ORTHOPAEDIC SURGERY

## 2022-07-15 PROCEDURE — 7100000000 HC PACU RECOVERY - FIRST 15 MIN: Performed by: ORTHOPAEDIC SURGERY

## 2022-07-15 PROCEDURE — 2720000010 HC SURG SUPPLY STERILE: Performed by: ORTHOPAEDIC SURGERY

## 2022-07-15 DEVICE — LONG NAIL KIT R1.5, TI, RIGHT
Type: IMPLANTABLE DEVICE | Site: HIP | Status: FUNCTIONAL
Brand: GAMMA

## 2022-07-15 DEVICE — LOCKING SCREW, FULLY THREADED: Type: IMPLANTABLE DEVICE | Site: HIP | Status: FUNCTIONAL

## 2022-07-15 DEVICE — LAG SCREW, TI
Type: IMPLANTABLE DEVICE | Site: HIP | Status: FUNCTIONAL
Brand: GAMMA

## 2022-07-15 RX ORDER — SODIUM CHLORIDE 9 MG/ML
INJECTION, SOLUTION INTRAVENOUS PRN
Status: DISCONTINUED | OUTPATIENT
Start: 2022-07-15 | End: 2022-07-19 | Stop reason: HOSPADM

## 2022-07-15 RX ORDER — MIDAZOLAM HYDROCHLORIDE 2 MG/2ML
2 INJECTION, SOLUTION INTRAMUSCULAR; INTRAVENOUS
Status: DISCONTINUED | OUTPATIENT
Start: 2022-07-15 | End: 2022-07-15 | Stop reason: HOSPADM

## 2022-07-15 RX ORDER — PROPOFOL 10 MG/ML
INJECTION, EMULSION INTRAVENOUS PRN
Status: DISCONTINUED | OUTPATIENT
Start: 2022-07-15 | End: 2022-07-15 | Stop reason: SDUPTHER

## 2022-07-15 RX ORDER — SODIUM CHLORIDE 0.9 % (FLUSH) 0.9 %
5-40 SYRINGE (ML) INJECTION PRN
Status: DISCONTINUED | OUTPATIENT
Start: 2022-07-15 | End: 2022-07-15 | Stop reason: HOSPADM

## 2022-07-15 RX ORDER — SODIUM CHLORIDE, SODIUM LACTATE, POTASSIUM CHLORIDE, CALCIUM CHLORIDE 600; 310; 30; 20 MG/100ML; MG/100ML; MG/100ML; MG/100ML
INJECTION, SOLUTION INTRAVENOUS CONTINUOUS
Status: DISCONTINUED | OUTPATIENT
Start: 2022-07-15 | End: 2022-07-15 | Stop reason: HOSPADM

## 2022-07-15 RX ORDER — OXYCODONE HYDROCHLORIDE 5 MG/1
5 TABLET ORAL PRN
Status: DISCONTINUED | OUTPATIENT
Start: 2022-07-15 | End: 2022-07-15 | Stop reason: HOSPADM

## 2022-07-15 RX ORDER — SODIUM CHLORIDE 0.9 % (FLUSH) 0.9 %
5-40 SYRINGE (ML) INJECTION PRN
Status: DISCONTINUED | OUTPATIENT
Start: 2022-07-15 | End: 2022-07-19 | Stop reason: HOSPADM

## 2022-07-15 RX ORDER — LIDOCAINE HYDROCHLORIDE 20 MG/ML
INJECTION, SOLUTION EPIDURAL; INFILTRATION; INTRACAUDAL; PERINEURAL PRN
Status: DISCONTINUED | OUTPATIENT
Start: 2022-07-15 | End: 2022-07-15 | Stop reason: SDUPTHER

## 2022-07-15 RX ORDER — HYDROMORPHONE HYDROCHLORIDE 2 MG/ML
0.5 INJECTION, SOLUTION INTRAMUSCULAR; INTRAVENOUS; SUBCUTANEOUS EVERY 5 MIN PRN
Status: DISCONTINUED | OUTPATIENT
Start: 2022-07-15 | End: 2022-07-15 | Stop reason: HOSPADM

## 2022-07-15 RX ORDER — EPHEDRINE SULFATE/0.9% NACL/PF 50 MG/5 ML
SYRINGE (ML) INTRAVENOUS PRN
Status: DISCONTINUED | OUTPATIENT
Start: 2022-07-15 | End: 2022-07-15 | Stop reason: SDUPTHER

## 2022-07-15 RX ORDER — DEXAMETHASONE SODIUM PHOSPHATE 10 MG/ML
INJECTION INTRAMUSCULAR; INTRAVENOUS PRN
Status: DISCONTINUED | OUTPATIENT
Start: 2022-07-15 | End: 2022-07-15 | Stop reason: SDUPTHER

## 2022-07-15 RX ORDER — ACETAMINOPHEN 500 MG
1000 TABLET ORAL ONCE
Status: DISCONTINUED | OUTPATIENT
Start: 2022-07-15 | End: 2022-07-15 | Stop reason: HOSPADM

## 2022-07-15 RX ORDER — ONDANSETRON 2 MG/ML
INJECTION INTRAMUSCULAR; INTRAVENOUS PRN
Status: DISCONTINUED | OUTPATIENT
Start: 2022-07-15 | End: 2022-07-15 | Stop reason: SDUPTHER

## 2022-07-15 RX ORDER — MORPHINE SULFATE 2 MG/ML
4 INJECTION, SOLUTION INTRAMUSCULAR; INTRAVENOUS
Status: DISCONTINUED | OUTPATIENT
Start: 2022-07-15 | End: 2022-07-18

## 2022-07-15 RX ORDER — ONDANSETRON 2 MG/ML
4 INJECTION INTRAMUSCULAR; INTRAVENOUS EVERY 6 HOURS PRN
Status: DISCONTINUED | OUTPATIENT
Start: 2022-07-15 | End: 2022-07-19 | Stop reason: HOSPADM

## 2022-07-15 RX ORDER — ONDANSETRON 2 MG/ML
4 INJECTION INTRAMUSCULAR; INTRAVENOUS
Status: DISCONTINUED | OUTPATIENT
Start: 2022-07-15 | End: 2022-07-15 | Stop reason: HOSPADM

## 2022-07-15 RX ORDER — DIPHENHYDRAMINE HYDROCHLORIDE 50 MG/ML
12.5 INJECTION INTRAMUSCULAR; INTRAVENOUS
Status: DISCONTINUED | OUTPATIENT
Start: 2022-07-15 | End: 2022-07-15 | Stop reason: HOSPADM

## 2022-07-15 RX ORDER — SODIUM CHLORIDE 0.9 % (FLUSH) 0.9 %
5-40 SYRINGE (ML) INJECTION EVERY 12 HOURS SCHEDULED
Status: DISCONTINUED | OUTPATIENT
Start: 2022-07-15 | End: 2022-07-15 | Stop reason: HOSPADM

## 2022-07-15 RX ORDER — DEXTROSE, SODIUM CHLORIDE, SODIUM LACTATE, POTASSIUM CHLORIDE, AND CALCIUM CHLORIDE 5; .6; .31; .03; .02 G/100ML; G/100ML; G/100ML; G/100ML; G/100ML
INJECTION, SOLUTION INTRAVENOUS CONTINUOUS
Status: DISCONTINUED | OUTPATIENT
Start: 2022-07-15 | End: 2022-07-16

## 2022-07-15 RX ORDER — FENTANYL CITRATE 50 UG/ML
INJECTION, SOLUTION INTRAMUSCULAR; INTRAVENOUS PRN
Status: DISCONTINUED | OUTPATIENT
Start: 2022-07-15 | End: 2022-07-15 | Stop reason: SDUPTHER

## 2022-07-15 RX ORDER — OXYCODONE HYDROCHLORIDE 5 MG/1
10 TABLET ORAL PRN
Status: DISCONTINUED | OUTPATIENT
Start: 2022-07-15 | End: 2022-07-15 | Stop reason: HOSPADM

## 2022-07-15 RX ORDER — FENTANYL CITRATE 50 UG/ML
100 INJECTION, SOLUTION INTRAMUSCULAR; INTRAVENOUS
Status: DISCONTINUED | OUTPATIENT
Start: 2022-07-15 | End: 2022-07-15 | Stop reason: HOSPADM

## 2022-07-15 RX ORDER — SODIUM CHLORIDE 0.9 % (FLUSH) 0.9 %
5-40 SYRINGE (ML) INJECTION EVERY 12 HOURS SCHEDULED
Status: DISCONTINUED | OUTPATIENT
Start: 2022-07-15 | End: 2022-07-19 | Stop reason: HOSPADM

## 2022-07-15 RX ORDER — PHENYLEPHRINE HYDROCHLORIDE 10 MG/ML
INJECTION INTRAVENOUS PRN
Status: DISCONTINUED | OUTPATIENT
Start: 2022-07-15 | End: 2022-07-15 | Stop reason: SDUPTHER

## 2022-07-15 RX ORDER — ONDANSETRON 4 MG/1
4 TABLET, ORALLY DISINTEGRATING ORAL EVERY 8 HOURS PRN
Status: DISCONTINUED | OUTPATIENT
Start: 2022-07-15 | End: 2022-07-19 | Stop reason: HOSPADM

## 2022-07-15 RX ADMIN — PROPOFOL 150 MG: 10 INJECTION, EMULSION INTRAVENOUS at 07:18

## 2022-07-15 RX ADMIN — CEFAZOLIN SODIUM 2000 MG: 100 INJECTION, POWDER, LYOPHILIZED, FOR SOLUTION INTRAVENOUS at 12:01

## 2022-07-15 RX ADMIN — SODIUM CHLORIDE, SODIUM LACTATE, POTASSIUM CHLORIDE, CALCIUM CHLORIDE, AND DEXTROSE MONOHYDRATE: 600; 310; 30; 20; 5 INJECTION, SOLUTION INTRAVENOUS at 10:17

## 2022-07-15 RX ADMIN — DEXAMETHASONE SODIUM PHOSPHATE 8 MG: 10 INJECTION INTRAMUSCULAR; INTRAVENOUS at 07:41

## 2022-07-15 RX ADMIN — LIDOCAINE HYDROCHLORIDE 60 MG: 20 INJECTION, SOLUTION EPIDURAL; INFILTRATION; INTRACAUDAL; PERINEURAL at 07:18

## 2022-07-15 RX ADMIN — FENTANYL CITRATE 50 MCG: 50 INJECTION, SOLUTION INTRAMUSCULAR; INTRAVENOUS at 07:43

## 2022-07-15 RX ADMIN — OXYCODONE 5 MG: 5 TABLET ORAL at 12:00

## 2022-07-15 RX ADMIN — CEFAZOLIN SODIUM 2000 MG: 100 INJECTION, POWDER, LYOPHILIZED, FOR SOLUTION INTRAVENOUS at 19:43

## 2022-07-15 RX ADMIN — FENTANYL CITRATE 25 MCG: 50 INJECTION, SOLUTION INTRAMUSCULAR; INTRAVENOUS at 07:18

## 2022-07-15 RX ADMIN — SODIUM CHLORIDE, SODIUM LACTATE, POTASSIUM CHLORIDE, AND CALCIUM CHLORIDE: 600; 310; 30; 20 INJECTION, SOLUTION INTRAVENOUS at 07:05

## 2022-07-15 RX ADMIN — METOPROLOL TARTRATE 75 MG: 25 TABLET, FILM COATED ORAL at 10:17

## 2022-07-15 RX ADMIN — PHENYLEPHRINE HYDROCHLORIDE 200 MCG: 10 INJECTION INTRAVENOUS at 07:23

## 2022-07-15 RX ADMIN — PHENYLEPHRINE HYDROCHLORIDE 100 MCG: 10 INJECTION INTRAVENOUS at 07:26

## 2022-07-15 RX ADMIN — SODIUM CHLORIDE, PRESERVATIVE FREE 10 ML: 5 INJECTION INTRAVENOUS at 21:08

## 2022-07-15 RX ADMIN — ONDANSETRON 4 MG: 2 INJECTION INTRAMUSCULAR; INTRAVENOUS at 06:55

## 2022-07-15 RX ADMIN — SODIUM CHLORIDE, PRESERVATIVE FREE 5 ML: 5 INJECTION INTRAVENOUS at 09:58

## 2022-07-15 RX ADMIN — SODIUM CHLORIDE, PRESERVATIVE FREE 5 ML: 5 INJECTION INTRAVENOUS at 10:21

## 2022-07-15 RX ADMIN — AMLODIPINE BESYLATE 10 MG: 10 TABLET ORAL at 21:08

## 2022-07-15 RX ADMIN — Medication 10 MG: at 07:24

## 2022-07-15 RX ADMIN — METOPROLOL TARTRATE 75 MG: 25 TABLET, FILM COATED ORAL at 21:08

## 2022-07-15 RX ADMIN — ATORVASTATIN CALCIUM 40 MG: 40 TABLET, FILM COATED ORAL at 21:08

## 2022-07-15 RX ADMIN — Medication 10 MG: at 07:22

## 2022-07-15 RX ADMIN — PHENYLEPHRINE HYDROCHLORIDE 100 MCG: 10 INJECTION INTRAVENOUS at 07:41

## 2022-07-15 ASSESSMENT — PAIN - FUNCTIONAL ASSESSMENT
PAIN_FUNCTIONAL_ASSESSMENT: PREVENTS OR INTERFERES SOME ACTIVE ACTIVITIES AND ADLS
PAIN_FUNCTIONAL_ASSESSMENT: 0-10
PAIN_FUNCTIONAL_ASSESSMENT: 0-10

## 2022-07-15 ASSESSMENT — PAIN SCALES - GENERAL
PAINLEVEL_OUTOF10: 8
PAINLEVEL_OUTOF10: 0

## 2022-07-15 ASSESSMENT — ENCOUNTER SYMPTOMS: RESPIRATORY NEGATIVE: 1

## 2022-07-15 ASSESSMENT — PAIN DESCRIPTION - ORIENTATION: ORIENTATION: RIGHT

## 2022-07-15 ASSESSMENT — PAIN DESCRIPTION - PAIN TYPE: TYPE: SURGICAL PAIN

## 2022-07-15 ASSESSMENT — PAIN DESCRIPTION - LOCATION: LOCATION: HIP

## 2022-07-15 ASSESSMENT — PAIN DESCRIPTION - ONSET: ONSET: GRADUAL

## 2022-07-15 ASSESSMENT — PAIN DESCRIPTION - FREQUENCY: FREQUENCY: INTERMITTENT

## 2022-07-15 ASSESSMENT — PAIN DESCRIPTION - DESCRIPTORS: DESCRIPTORS: ACHING

## 2022-07-15 NOTE — INTERVAL H&P NOTE
Update History & Physical    The Patient's History and Physical of 7/13/2022 was reviewed with the patient and I examined the patient. There was no change. The surgical site was confirmed by the patient and me. Plan:  The risk, benefits, expected outcome, and alternative to the recommended procedure have been discussed with the patient. Patient understands and wants to proceed with open treatment of right proximal femur fracture with intramedullary nail fixation.   Electronically signed by Ximena Miranda MD on 7/15/2022 at 7:07 AM

## 2022-07-15 NOTE — PERIOP NOTE
TRANSFER - OUT REPORT:    Verbal report given to [facility] on [address]  being transferred to [phone] for routine post-op       Report consisted of patients Situation, Background, Assessment and   Recommendations(SBAR). Information from the following report(s) Surgery Report, MAR, and Cardiac Rhythm nsr  was reviewed with the receiving nurse. Lines:   Peripheral IV 07/13/22 Left Antecubital (Active)   Site Assessment Clean, dry & intact 07/15/22 0813   Line Status Capped 07/15/22 0813   Line Care Connections checked and tightened 07/14/22 0730   Phlebitis Assessment No symptoms 07/15/22 0813   Infiltration Assessment 0 07/15/22 0813   Alcohol Cap Used Yes 07/15/22 0813   Dressing Status Clean, dry & intact 07/15/22 0813   Dressing Type Transparent 07/14/22 1941        Opportunity for questions and clarification was provided. Patient transported with:   Monitor  O2 @ 3 liters  Registered Nurse    VTE prophylaxis orders have been written for [address]. Patient and family given floor number and nurses name. Family updated re: pt status after security code verified.

## 2022-07-15 NOTE — CARE COORDINATION
MSN, CM:  patient had right proximal femur fx with intramedullary nail fixation today. Patient will have PT/OT tomorrow. Case Management will continue to follow.

## 2022-07-15 NOTE — PROGRESS NOTES
Hospitalist Progress Note   Admit Date:  2022 10:25 AM   Name:  Vivien Posada   Age:  66 y.o. Sex:  female  :  1950   MRN:  [id number]   Room:  [facility]/    Presenting Complaint: Hip Pain     Reason(s) for Admission: Closed fracture of right hip, initial encounter [facility]) [S72.001A]  Intertrochanteric fracture of right hip, closed, initial encounter [facility]) [facility] Course & Interval History:   Patient is a 66 y.o. female with hx of  HTN, HLD, and CKD stage 3,who presented to the ED for cc right hip pain after a fall unable to bear weight. admitted for R hip fracture. Subjective/24hr Events (07/15/22): Pt feeling OK today. Had surgery this morning. A little bit of mild soreness starting to creep back into her R knee postop. No fevers, CP, SOB    Assessment & Plan:       Intertrochanteric fracture of right hip, closed, initial encounter ([facility]  07/15/22 -POD #0. PT/OT evals      Anemia   07/15/22 -iron studies with no deficiency. Daily CBC      HTN (hypertension)  -cont metoprolol, norvasc      HLD (hyperlipidemia)  -cont lipitor      Stage 3a chronic kidney disease ([facility]  07/15/22 -BMP at baseline, recheck in AM      Discharge Planning: To STR 2 nights after surgery    Diet:  ADULT DIET; Regular  DVT PPx: per ortho  Code status: Full Code    Hospital Problems:  Principal Problem:    Intertrochanteric fracture of right hip, closed, initial encounter ([facility]  Active Problems:    HTN (hypertension)    HLD (hyperlipidemia)    Stage 3a chronic kidney disease ([facility]  Resolved Problems:    * No resolved hospital problems.  *      Objective:     Patient Vitals for the past 24 hrs:   Temp Pulse Resp BP SpO2   07/15/22 1137 98.8 °F (37.1 °C) 90 18 121/64 92 %   07/15/22 0859 -- 75 16 134/63 91 %   07/15/22 0854 -- 72 16 134/61 93 %   07/15/22 0849 -- 78 14 (!) 145/63 93 %   07/15/22 0844 -- 82 16 (!) 144/66 93 %   07/15/22 0839 -- 84 15 (!) 161/67 96 %   07/15/22 0834 -- 77 14 (!) A&Ox3  Psych:  Normal mood and affect.       I have reviewed ordered lab tests and independently visualized imaging below:    Recent Labs:  Recent Results (from the past 48 hour(s))   TYPE AND SCREEN    Collection Time: 07/13/22  2:40 PM   Result Value Ref Range    Crossmatch expiration date 07/16/2022,2359     ABO/Rh O POSITIVE     Antibody Screen NEG    Urinalysis w rflx microscopic    Collection Time: 07/13/22  2:55 PM   Result Value Ref Range    Color, UA YELLOW/STRAW      Appearance CLEAR      Specific Gravity, UA 1.016 1.001 - 1.023      pH, Urine 5.5 5.0 - 9.0      Protein, UA Negative NEG mg/dL    Glucose, UA Negative mg/dL    Ketones, Urine Negative NEG mg/dL    Bilirubin Urine Negative NEG      Blood, Urine Negative NEG      Urobilinogen, Urine 0.2 0.2 - 1.0 EU/dL    Nitrite, Urine Negative NEG      Leukocyte Esterase, Urine Negative NEG     Basic Metabolic Panel w/ Reflex to MG    Collection Time: 07/14/22  4:38 AM   Result Value Ref Range    Sodium 139 136 - 145 mmol/L    Potassium 3.9 3.5 - 5.1 mmol/L    Chloride 108 (H) 98 - 107 mmol/L    CO2 29 21 - 32 mmol/L    Anion Gap 2 (L) 7 - 16 mmol/L    Glucose 97 65 - 100 mg/dL    BUN 27 (H) 8 - 23 MG/DL    CREATININE 1.20 (H) 0.6 - 1.0 MG/DL    GFR  57 (L) >60 ml/min/1.73m2    GFR Non- 47 (L) >60 ml/min/1.73m2    Calcium 8.7 8.3 - 10.4 MG/DL   CBC with Auto Differential    Collection Time: 07/14/22  4:38 AM   Result Value Ref Range    WBC 4.0 (L) 4.3 - 11.1 K/uL    RBC 3.35 (L) 4.05 - 5.2 M/uL    Hemoglobin 9.9 (L) 11.7 - 15.4 g/dL    Hematocrit 30.4 (L) 35.8 - 46.3 %    MCV 90.7 79.6 - 97.8 FL    MCH 29.6 26.1 - 32.9 PG    MCHC 32.6 31.4 - 35.0 g/dL    RDW 13.4 11.9 - 14.6 %    Platelets 98 (L) 815 - 450 K/uL    MPV 11.4 9.4 - 12.3 FL    nRBC 0.00 0.0 - 0.2 K/uL    Differential Type AUTOMATED      Seg Neutrophils 64 43 - 78 %    Lymphocytes 18 13 - 44 %    Monocytes 16 (H) 4.0 - 12.0 %    Eosinophils % 2 0.5 - 7.8 % Basophils 0 0.0 - 2.0 %    Immature Granulocytes 1 0.0 - 5.0 %    Segs Absolute 2.6 1.7 - 8.2 K/UL    Absolute Lymph # 0.7 0.5 - 4.6 K/UL    Absolute Mono # 0.6 0.1 - 1.3 K/UL    Absolute Eos # 0.1 0.0 - 0.8 K/UL    Basophils Absolute 0.0 0.0 - 0.2 K/UL    Absolute Immature Granulocyte 0.0 0.0 - 0.5 K/UL   CBC    Collection Time: 07/15/22  5:01 AM   Result Value Ref Range    WBC 4.8 4.3 - 11.1 K/uL    RBC 3.46 (L) 4.05 - 5.2 M/uL    Hemoglobin 10.3 (L) 11.7 - 15.4 g/dL    Hematocrit 31.7 (L) 35.8 - 46.3 %    MCV 91.6 79.6 - 97.8 FL    MCH 29.8 26.1 - 32.9 PG    MCHC 32.5 31.4 - 35.0 g/dL    RDW 13.4 11.9 - 14.6 %    Platelets 505 (L) 037 - 450 K/uL    MPV 11.1 9.4 - 12.3 FL    nRBC 0.00 0.0 - 0.2 K/uL   Basic Metabolic Panel w/ Reflex to MG    Collection Time: 07/15/22  5:01 AM   Result Value Ref Range    Sodium 137 136 - 145 mmol/L    Potassium 4.0 3.5 - 5.1 mmol/L    Chloride 106 98 - 107 mmol/L    CO2 28 21 - 32 mmol/L    Anion Gap 3 (L) 7 - 16 mmol/L    Glucose 112 (H) 65 - 100 mg/dL    BUN 33 (H) 8 - 23 MG/DL    CREATININE 1.30 (H) 0.6 - 1.0 MG/DL    GFR African American 52 (L) >60 ml/min/1.73m2    GFR Non- 43 (L) >60 ml/min/1.73m2    Calcium 8.9 8.3 - 10.4 MG/DL   Iron    Collection Time: 07/15/22  5:01 AM   Result Value Ref Range    Iron 29 (L) 35 - 150 ug/dL   Transferrin Saturation    Collection Time: 07/15/22  5:01 AM   Result Value Ref Range    Iron 28 (L) 35 - 150 ug/dL    TIBC 294 250 - 450 ug/dL    TRANSFERRIN SATURATION 10 (L) >20 %   Ferritin    Collection Time: 07/15/22  5:01 AM   Result Value Ref Range    Ferritin 534 (H) 8 - 388 NG/ML       Other Studies:  XR HIP RIGHT (2-3 VIEWS)   Final Result   FINDINGS / IMPRESSION: Nail through the femoral neck and long intramedullary kevin   have been placed reducing and transfixing the intertrochanteric fracture. NC XR TECHNOLOGIST SERVICE   Final Result      XR HIP RIGHT (2-3 VIEWS)   Final Result      1.  Comminuted right intertrochanteric femoral neck fracture. XR FEMUR RIGHT (MIN 2 VIEWS)   Final Result      1. Intertrochanteric right femoral neck fracture. XR CHEST PORTABLE   Final Result      1. No acute cardiopulmonary process. CPT code(s) 82031                      Current Meds:  Current Facility-Administered Medications   Medication Dose Route Frequency    morphine injection 4 mg  4 mg IntraVENous Q1H PRN    sodium chloride flush 0.9 % injection 5-40 mL  5-40 mL IntraVENous 2 times per day    sodium chloride flush 0.9 % injection 5-40 mL  5-40 mL IntraVENous PRN    0.9 % sodium chloride infusion   IntraVENous PRN    ondansetron (ZOFRAN-ODT) disintegrating tablet 4 mg  4 mg Oral Q8H PRN    Or    ondansetron (ZOFRAN) injection 4 mg  4 mg IntraVENous Q6H PRN    [START ON 7/16/2022] aspirin EC tablet 325 mg  325 mg Oral Daily    dextrose 5 % in lactated ringers infusion   IntraVENous Continuous    ceFAZolin (ANCEF) 2000 mg in sterile water 20 mL IV syringe  2,000 mg IntraVENous Q8H    atorvastatin (LIPITOR) tablet 40 mg  40 mg Oral Nightly    amLODIPine (NORVASC) tablet 10 mg  10 mg Oral Nightly    metoprolol tartrate (LOPRESSOR) tablet 75 mg  75 mg Oral BID    sodium chloride flush 0.9 % injection 5-40 mL  5-40 mL IntraVENous 2 times per day    ondansetron (ZOFRAN-ODT) disintegrating tablet 4 mg  4 mg Oral Q8H PRN    Or    ondansetron (ZOFRAN) injection 4 mg  4 mg IntraVENous Q6H PRN    polyethylene glycol (GLYCOLAX) packet 17 g  17 g Oral Daily PRN    acetaminophen (TYLENOL) tablet 650 mg  650 mg Oral Q6H PRN    Or    acetaminophen (TYLENOL) suppository 650 mg  650 mg Rectal Q6H PRN    oxyCODONE (ROXICODONE) immediate release tablet 5 mg  5 mg Oral Q4H PRN    tuberculin injection 5 Units  5 Units IntraDERmal Once       Signed:  Elisa Butler MD    Part of this note may have been written by using a voice dictation software.   The note has been proof read but may still contain some grammatical/other typographical errors.

## 2022-07-15 NOTE — PROGRESS NOTES
Patient resting in bed on room air. A&Ox4. Respirations even and unlabored. Patient denies pain and is in no acute distress at this time. Harper patent and draining clear, yellow urine. Harper placed for immobilization, pt with R hip fx. No needs expressed. Call light within reach, will continue to monitor.

## 2022-07-15 NOTE — OP NOTE
Operative Report    Patient: Ildefonso Aceves MRN: [id number]  SSN: [id number]    YOB: 1950  Age: 66 y.o. Sex: female       Date of Surgery: July 15, 2022     History:  Ildefonso Aceves is a 66 y.o. female who fell and injured her right hip. She was seen in the emergency room and found to have a right intertrochanteric proximal femur fracture. She does have a history of having a right posterior wall stem fracture about 15 years ago that was treated with screw fixation but this involves hardware and the actual posterior column none of the femur itself. I had spoken with her regarding the nature of her injury and the nature of the surgical procedure and what this would involve as far as where her incisions would be. After talking her about this she seemed to feel comfortable consenting. I talked to the patient and/or their representative and explained the exact nature the procedure. I also went through a detailed list of the material risks associated with  the procedure which included risk of bleeding, infection, injury to nearby structures, worsening the situation, as well as the risks associate with anesthesia and finally death. Also talked with him regarding the benefits and alternatives to the procedure. Preoperative Diagnosis: Closed fracture of right hip, initial encounter ([facility] [S72.001A]     Postoperative Diagnosis:   Closed displaced right intertrochanteric proximal femur fracture      Surgeon(s) and Role:     * Ximena Miranda MD - Primary    Anesthesia: General     Procedure: Open treatment of right intertrochanteric proximal femur fracture with intramedullary nail fixation    Procedure in Detail: After successful  induction of general anesthetic the right lower extremity was placed in gentle boot traction on a fracture table and we made sure we could adequately visualize the osteoporotic proximal femur and that an adequate closed reduction could be obtained.   I then prepped and draped the right hip and thigh area and made a small incision just proximal to the area the greater trochanter and placed the cannulated awl into the tip the greater trochanter on both the AP and lateral projection and once it was in appropriate position placed a guidewire down the shaft of the femur and then reamed sequentially up to 13 mm for the shaft of the femur and to 15.5 mm proximally. I then measured for a 400 mm nail and placed the actual nail. Once the nail was in appropriate position I placed a guidewire in the center of the femoral head on both the AP and lateral projection. Once this was in appropriate position I drilled for and placed a 95 mm lag screw proximally. Once this was in position I then placed a set screw in the proximal portion of the nail and tightened this all the way down and backed off a half of a turn to allow for dynamic compression. I then placed a single interlock bolt distally using freehand technique. I then removed the insertion handle and checked the final reduction as well as the placement of the hardware. I was very pleased with this I then closed incisions with 2.0 Monocryl for the subcutaneous tissue and staples for the skin. Dressings were applied. The patient was awakened and taken recovery in stable condition there were no apparent complications      Estimated Blood Loss: 150 cc    Tourniquet Time: * No tourniquets in log *      Implants:   Implant Name Type Inv.  Item Serial No.  Lot No. LRB No. Used Action   NAIL IM L400MM [id number] 130DEG RAD OF CURVATURE 1.5M LNG R - [id number]  NAIL IM L400MM [id number] 130DEG RAD OF CURVATURE 1.5M LNG R  [employer] [facility] [id number] Right 1 Implanted   SCREW BNE L100MM DIA10.5MM CANC HIP TI LAG ST Gradeable MELODY - [id number]  SCREW BNE L100MM DIA10.5MM CANC HIP TI LAG ST ABILIO [facility]  [employer] [facility] [id number] Right 1 Implanted   SCREW BNE L60MM DIA5MM JOSE TI MELODY FULL THRD SHFT FOR T2 IM - [id number]  SCREW BNE L60MM DIA5MM JOSE TI MELODY FULL THRD SHFT FOR T2 IM  [employer] HOWM-WD [id number] Right 1 Implanted               Specimens: * No specimens in log *        Drains: None                Complications: None    Counts: Sponge and needle counts were correct times two.     Signed By:  Leonides Burton MD     July 15, 2022

## 2022-07-15 NOTE — PERIOP NOTE
TRANSFER - IN REPORT:    Verbal report received from Gayle John on [address]  being received from [facility] for ordered procedure      Report consisted of patient's Situation, Background, Assessment and   Recommendations(SBAR). Information from the following report(s) Pre Procedure Checklist was reviewed with the receiving nurse. Opportunity for questions and clarification was provided. Assessment completed upon patient's arrival to unit and care assumed.

## 2022-07-15 NOTE — PROGRESS NOTES
Physical Therapy Note:    PT orders received. PT in surgery today with Dr. Wolf Franks. Will HOLD PT today and evaluate POD #1.    Fabrizio Rodriguez, PT     Rehab Caseload Tracker

## 2022-07-15 NOTE — PROGRESS NOTES
TRANSFER - OUT REPORT:    Verbal report given to [address], RN on [employer]  being transferred to Pre-op for ordered procedure       Report consisted of patient's Situation, Background, Assessment and   Recommendations(SBAR). Information from the following report(s) Nurse Handoff Report was reviewed with the receiving nurse. Lines:   Peripheral IV 07/13/22 Left Antecubital (Active)   Site Assessment Clean, dry & intact 07/14/22 1941   Line Status Capped 07/14/22 1941   Line Care Connections checked and tightened 07/14/22 0730   Phlebitis Assessment No symptoms 07/14/22 1941   Infiltration Assessment 0 07/14/22 1941   Alcohol Cap Used Yes 07/14/22 1941   Dressing Status Clean, dry & intact 07/14/22 1941   Dressing Type Transparent 07/14/22 1941        Opportunity for questions and clarification was provided.       Patient transported with:  Registered Nurse  and Tech, & per pt request - along with patient's belongings

## 2022-07-15 NOTE — PROGRESS NOTES
OT orders received. Patient currently in [address] [place] [address] with Dr. Kami Rodriguez. HOLD OT today and evaluate POD#1.    Yoanna Breen, OT

## 2022-07-15 NOTE — ANESTHESIA PRE PROCEDURE
Department of Anesthesiology  Preprocedure Note       Name:  Malachi Silveira   Age:  66 y.o.  :  1950                                          MRN:  [id number]         Date:  2022      Surgeon: Linda Seay):  Garcia Sommer MD    Procedure: Procedure(s):  RIGHT GAMMA NAIL    Medications prior to admission:   Prior to Admission medications    Not on File       Current medications:    Current Facility-Administered Medications   Medication Dose Route Frequency Provider Last Rate Last Admin    atorvastatin (LIPITOR) tablet 40 mg  40 mg Oral Nightly Al Crank, DO   40 mg at 22    amLODIPine (NORVASC) tablet 10 mg  10 mg Oral Nightly [place] Crank, DO   10 mg at 22    metoprolol tartrate (LOPRESSOR) tablet 75 mg  75 mg Oral BID Al Crank, DO   75 mg at 22    sodium chloride flush 0.9 % injection 5-40 mL  5-40 mL IntraVENous 2 times per day Al Crank, DO   10 mL at 22    sodium chloride flush 0.9 % injection 5-40 mL  5-40 mL IntraVENous PRN [place] Crank, DO        0.9 % sodium chloride infusion   IntraVENous PRN Al Crank, DO        ondansetron (ZOFRAN-ODT) disintegrating tablet 4 mg  4 mg Oral Q8H PRN [place] Crank, DO        Or    ondansetron (ZOFRAN) injection 4 mg  4 mg IntraVENous Q6H PRN [place] Crank, DO        polyethylene glycol (GLYCOLAX) packet 17 g  17 g Oral Daily PRN Al Crank, DO        acetaminophen (TYLENOL) tablet 650 mg  650 mg Oral Q6H PRN [place] Crank, DO   650 mg at 22    Or    acetaminophen (TYLENOL) suppository 650 mg  650 mg Rectal Q6H PRN [place] Crank, DO        morphine injection 1 mg  1 mg IntraVENous Q4H PRN [place] Crank, DO   1 mg at 22 1449    oxyCODONE (ROXICODONE) immediate release tablet 5 mg  5 mg Oral Q4H PRN [place] Crank, DO        tuberculin injection 5 Units  5 Units IntraDERmal Once [place] Crank, DO           Allergies:     Allergies 8.7 07/14/2022 04:38 AM    BILITOT 0.3 07/13/2022 11:16 AM    ALKPHOS 69 07/13/2022 11:16 AM    AST 26 07/13/2022 11:16 AM    ALT 19 07/13/2022 11:16 AM       POC Tests: No results for input(s): POCGLU, POCNA, POCK, POCCL, POCBUN, POCHEMO, POCHCT in the last 72 hours. Coags:   Lab Results   Component Value Date/Time    PROTIME 12.6 07/13/2022 11:16 AM    INR 0.9 07/13/2022 11:16 AM       HCG (If Applicable): No results found for: PREGTESTUR, PREGSERUM, HCG, HCGQUANT     ABGs: No results found for: PHART, PO2ART, FXC7AGG, BCD2SBB, BEART, B4FKYCSW     Type & Screen (If Applicable):  No results found for: LABABO, LABRH    Drug/Infectious Status (If Applicable):  No results found for: HIV, HEPCAB    COVID-19 Screening (If Applicable): No results found for: COVID19        Anesthesia Evaluation  Patient summary reviewed and Nursing notes reviewed  Airway: Mallampati: I  TM distance: >3 FB   Neck ROM: full  Mouth opening: > = 3 FB   Dental: normal exam         Pulmonary: breath sounds clear to auscultation                             Cardiovascular:    (+) hypertension: mild, hyperlipidemia        Rhythm: regular  Rate: normal                    Neuro/Psych:               GI/Hepatic/Renal:   (+) renal disease (Cr. 1.6): CRI,           Endo/Other:                      ROS comment: Intertrochanteric fracture of right hip, closed, initial  Abdominal:             Vascular: Other Findings:           Anesthesia Plan      general     ASA 2     (LMA should be ok tomorrow)  Induction: intravenous. Anesthetic plan and risks discussed with patient.                         Melynda Carrel, MD   7/14/2022

## 2022-07-15 NOTE — ANESTHESIA POSTPROCEDURE EVALUATION
Department of Anesthesiology  Postprocedure Note    Patient: Sinai Morgan  MRN: [id number]  YOB: 1950  Date of evaluation: 7/15/2022      Procedure Summary     Date: 07/15/22 Room / Location: [facility] MAIN OR 07 / [facility] MAIN OR    Anesthesia Start: 0712 Anesthesia Stop: 6084    Procedure: RIGHT GAMMA NAIL (Right: Hip) Diagnosis:       Closed fracture of right hip, initial encounter ([facility]      (Closed fracture of right hip, initial encounter ([facility] [S72.001A])    Providers: Leonides Burton MD Responsible Provider: Johnathan Espinoza MD    Anesthesia Type: general ASA Status: 2          Anesthesia Type: No value filed.     King Phase I: King Score: 9    King Phase II:        Anesthesia Post Evaluation    Patient location during evaluation: PACU  Patient participation: complete - patient participated  Level of consciousness: awake and alert  Airway patency: patent  Nausea & Vomiting: no nausea and no vomiting  Complications: no  Cardiovascular status: hemodynamically stable  Respiratory status: acceptable, nonlabored ventilation and spontaneous ventilation  Hydration status: euvolemic  Comments: /63   Pulse 75   Temp 97.9 °F (36.6 °C) (Temporal)   Resp 16   Ht 5' 4\" (1.626 m)   Wt 145 lb (65.8 kg)   SpO2 91%   BMI 24.89 kg/m²     Multimodal analgesia pain management approach

## 2022-07-15 NOTE — ED PROVIDER NOTES
[employer] Emergency Department Provider Note                   PCP:                Luz Coburn MD               Age: 66 y.o. Sex: female       ICD-10-CM    1. Cl[facility] fracture of right hip, [facility]  S72.001A           DISPOSITION Admitted 07/13/2022 01:46:54 PM       MDM  Number of Diagnoses or Management Options  Closed fracture of right hip, initial encounter [facility])  Diagnosis management comments: Patient with a nonsyncopal fall with radiographic confirmation of right sided intratrochanteric fracture will require hospitalization and orthopedic intervention       Amount and/or Complexity of Data Reviewed  Clinical lab tests: ordered and reviewed  Tests in the radiology section of CPT®: reviewed and ordered  Obtain history from someone other than the patient: yes  Review and summarize past medical records: yes  Discuss the patient with other providers: yes  Independent visualization of images, tracings, or specimens: yes    Risk of Complications, Morbidity, and/or Mortality  Presenting problems: high  Diagnostic procedures: high  Management options: high         Orders Placed This Encounter   Procedures    XR HIP RIGHT (2-3 VIEWS)    XR FEMUR RIGHT (MIN 2 VIEWS)    XR CHEST PORTABLE    NC XR TECHNOLOGIST SERVICE    XR HIP RIGHT (2-3 VIEWS)    CBC with Auto Differential    CMP    Protime-INR    Urinalysis w rflx microscopic    Basic Metabolic Panel w/ Reflex to MG    CBC with Auto Differential    CBC    Basic Metabolic Panel w/ Reflex to MG    Iron    Transferrin Saturation    Ferritin    Hemoglobin and Hematocrit    ADULT DIET;  Regular    Vital signs per unit routine    Strict Bedrest    Place intermittent pneumatic compression device    Misc nursing order (specify)    Insert pérez catheter    TREATMENT CONSENT    Vital signs per unit routine    Activity - Beginning POD #1    Wound Care Instructions    Discontinue indwelling urinary catheter    Encourage deep breathing and coughing every 2 hours while awake    Place intermittent pneumatic compression device    Full Weight Bearing as tolerated    Full code    Inpatient consult to Orthopedic Surgery    OT eval and treat    OT eval and treat    PT eval and treat    PT eval and treat    Initiate Oxygen Therapy Protocol    Initiate Oxygen Therapy Protocol    Incentive spirometry    PLEASE READ & DOCUMENT PPD TEST IN 24 HRS    PLEASE READ & DOCUMENT PPD TEST IN 50 HRS    PLEASE READ & DOCUMENT PPD TEST IN 72 HRS    EKG 12 Lead    TYPE AND SCREEN    Saline lock IV    ADMIT TO INPATIENT    Transfer Patient        Madelyn Trevizo is a 66 y.o. female who presents to the Emergency Department with chief complaint of    Chief Complaint   Patient presents with    Hip Pain      Patient was in her kitchen she made a step after turning and then fell she denies any syncope related to this. She landed hard onto her hip and has been unable to stand or bear weight. She did not strike her head significantly. There is no loss of consciousness history. No rib pain or shortness of breath. No thoracic or lumbar pain. No neck pain. No cuts associated with this. No distal motor or sensory changes. The history is provided by the patient and a relative. Hip Pain  This is a new problem. The current episode started 1 to 2 hours ago. The problem occurs constantly. All other systems reviewed and are negative. Review of Systems   Constitutional:  Negative for chills and fever. HENT: Negative. Respiratory: Negative. Genitourinary: Negative. Psychiatric/Behavioral:  Negative for behavioral problems and confusion. All other systems reviewed and are negative. History reviewed. No pertinent past medical history. Past Surgical History:   Procedure Laterality Date    FEMUR FRACTURE SURGERY Right 7/15/2022    RIGHT GAMMA NAIL performed by Nereida Simmons MD at [facility] MAIN OR        History reviewed. No pertinent family history.      Social TYPE AND SCREEN        XR HIP RIGHT (2-3 VIEWS)   Final Result   FINDINGS / IMPRESSION: Nail through the femoral neck and long intramedullary kevin   have been placed reducing and transfixing the intertrochanteric fracture. NC XR TECHNOLOGIST SERVICE   Final Result      XR HIP RIGHT (2-3 VIEWS)   Final Result      1. Comminuted right intertrochanteric femoral neck fracture. XR FEMUR RIGHT (MIN 2 VIEWS)   Final Result      1. Intertrochanteric right femoral neck fracture. XR CHEST PORTABLE   Final Result      1. No acute cardiopulmonary process. CPT code(s) 75797                                           Voice dictation software was used during the making of this note. This software is not perfect and grammatical and other typographical errors may be present. This note has not been completely proofread for errors.        Rebeca Chirinos MD  07/15/22 5218

## 2022-07-16 PROBLEM — D62 POSTOPERATIVE ANEMIA DUE TO ACUTE BLOOD LOSS: Status: ACTIVE | Noted: 2022-07-16

## 2022-07-16 LAB
ANION GAP SERPL CALC-SCNC: 1 MMOL/L (ref 7–16)
BUN SERPL-MCNC: 37 MG/DL (ref 8–23)
CALCIUM SERPL-MCNC: 8.3 MG/DL (ref 8.3–10.4)
CHLORIDE SERPL-SCNC: 109 MMOL/L (ref 98–107)
CO2 SERPL-SCNC: 28 MMOL/L (ref 21–32)
CREAT SERPL-MCNC: 1.2 MG/DL (ref 0.6–1)
ERYTHROCYTE [DISTWIDTH] IN BLOOD BY AUTOMATED COUNT: 13.2 % (ref 11.9–14.6)
GLUCOSE SERPL-MCNC: 114 MG/DL (ref 65–100)
HCT VFR BLD AUTO: 24.4 % (ref 35.8–46.3)
HGB BLD-MCNC: 8 G/DL (ref 11.7–15.4)
MCH RBC QN AUTO: 30.3 PG (ref 26.1–32.9)
MCHC RBC AUTO-ENTMCNC: 32.8 G/DL (ref 31.4–35)
MCV RBC AUTO: 92.4 FL (ref 79.6–97.8)
NRBC # BLD: 0 K/UL (ref 0–0.2)
PLATELET # BLD AUTO: 89 K/UL (ref 150–450)
PMV BLD AUTO: 11.8 FL (ref 9.4–12.3)
POTASSIUM SERPL-SCNC: 4.2 MMOL/L (ref 3.5–5.1)
RBC # BLD AUTO: 2.64 M/UL (ref 4.05–5.2)
SODIUM SERPL-SCNC: 138 MMOL/L (ref 136–145)
WBC # BLD AUTO: 6.3 K/UL (ref 4.3–11.1)

## 2022-07-16 PROCEDURE — 97166 OT EVAL MOD COMPLEX 45 MIN: CPT

## 2022-07-16 PROCEDURE — 2580000003 HC RX 258: Performed by: ORTHOPAEDIC SURGERY

## 2022-07-16 PROCEDURE — 36415 COLL VENOUS BLD VENIPUNCTURE: CPT

## 2022-07-16 PROCEDURE — 80048 BASIC METABOLIC PNL TOTAL CA: CPT

## 2022-07-16 PROCEDURE — 97530 THERAPEUTIC ACTIVITIES: CPT

## 2022-07-16 PROCEDURE — 97535 SELF CARE MNGMENT TRAINING: CPT

## 2022-07-16 PROCEDURE — 6370000000 HC RX 637 (ALT 250 FOR IP): Performed by: ORTHOPAEDIC SURGERY

## 2022-07-16 PROCEDURE — 97161 PT EVAL LOW COMPLEX 20 MIN: CPT

## 2022-07-16 PROCEDURE — 85027 COMPLETE CBC AUTOMATED: CPT

## 2022-07-16 PROCEDURE — 1100000000 HC RM PRIVATE

## 2022-07-16 RX ORDER — DIMETHICONE, CAMPHOR (SYNTHETIC), MENTHOL, AND PHENOL 1.1; .5; .625; .5 G/100G; G/100G; G/100G; G/100G
OINTMENT TOPICAL PRN
Status: DISCONTINUED | OUTPATIENT
Start: 2022-07-16 | End: 2022-07-19 | Stop reason: HOSPADM

## 2022-07-16 RX ADMIN — SODIUM CHLORIDE, PRESERVATIVE FREE 20 ML: 5 INJECTION INTRAVENOUS at 21:52

## 2022-07-16 RX ADMIN — SODIUM CHLORIDE, PRESERVATIVE FREE 10 ML: 5 INJECTION INTRAVENOUS at 21:52

## 2022-07-16 RX ADMIN — SODIUM CHLORIDE, PRESERVATIVE FREE 10 ML: 5 INJECTION INTRAVENOUS at 08:21

## 2022-07-16 RX ADMIN — AMLODIPINE BESYLATE 10 MG: 10 TABLET ORAL at 21:52

## 2022-07-16 RX ADMIN — METOPROLOL TARTRATE 75 MG: 25 TABLET, FILM COATED ORAL at 08:20

## 2022-07-16 RX ADMIN — ATORVASTATIN CALCIUM 40 MG: 40 TABLET, FILM COATED ORAL at 21:52

## 2022-07-16 RX ADMIN — ASPIRIN 325 MG: 325 TABLET, COATED ORAL at 08:21

## 2022-07-16 ASSESSMENT — PAIN SCALES - GENERAL
PAINLEVEL_OUTOF10: 0

## 2022-07-16 NOTE — PROGRESS NOTES
Patient says she takes febuxostat for gout  instructed to have family bring from home  patient states allergic to colchicine and allopurinol   allergy list updated

## 2022-07-16 NOTE — PROGRESS NOTES
Department of Orthopedic Surgery  Attending Progress Note      SUBJECTIVE doing okay this morning    OBJECTIVE    Physical    VITALS:  BP (!) 128/58   Pulse 77   Temp 98.6 °F (37 °C) (Oral)   Resp 20   Ht 5' 4\" (1.626 m)   Wt 145 lb (65.8 kg)   SpO2 97%   BMI 24.89 kg/m²   She has grossly intact function in her operative extremity    Data    CBC:   Lab Results   Component Value Date/Time    WBC 6.3 07/16/2022 04:04 AM    RBC 2.64 07/16/2022 04:04 AM    HGB 8.0 07/16/2022 04:04 AM    HCT 24.4 07/16/2022 04:04 AM    MCV 92.4 07/16/2022 04:04 AM    MCH 30.3 07/16/2022 04:04 AM    MCHC 32.8 07/16/2022 04:04 AM    RDW 13.2 07/16/2022 04:04 AM    PLT 89 07/16/2022 04:04 AM    MPV 11.8 07/16/2022 04:04 AM     Current Inpatient Medications    Current Facility-Administered Medications: morphine injection 4 mg, 4 mg, IntraVENous, Q1H PRN  sodium chloride flush 0.9 % injection 5-40 mL, 5-40 mL, IntraVENous, 2 times per day  sodium chloride flush 0.9 % injection 5-40 mL, 5-40 mL, IntraVENous, PRN  0.9 % sodium chloride infusion, , IntraVENous, PRN  ondansetron (ZOFRAN-ODT) disintegrating tablet 4 mg, 4 mg, Oral, Q8H PRN **OR** ondansetron (ZOFRAN) injection 4 mg, 4 mg, IntraVENous, Q6H PRN  aspirin EC tablet 325 mg, 325 mg, Oral, Daily  atorvastatin (LIPITOR) tablet 40 mg, 40 mg, Oral, Nightly  amLODIPine (NORVASC) tablet 10 mg, 10 mg, Oral, Nightly  metoprolol tartrate (LOPRESSOR) tablet 75 mg, 75 mg, Oral, BID  sodium chloride flush 0.9 % injection 5-40 mL, 5-40 mL, IntraVENous, 2 times per day  ondansetron (ZOFRAN-ODT) disintegrating tablet 4 mg, 4 mg, Oral, Q8H PRN **OR** ondansetron (ZOFRAN) injection 4 mg, 4 mg, IntraVENous, Q6H PRN  polyethylene glycol (GLYCOLAX) packet 17 g, 17 g, Oral, Daily PRN  acetaminophen (TYLENOL) tablet 650 mg, 650 mg, Oral, Q6H PRN **OR** acetaminophen (TYLENOL) suppository 650 mg, 650 mg, Rectal, Q6H PRN  oxyCODONE (ROXICODONE) immediate release tablet 5 mg, 5 mg, Oral, Q4H PRN    ASSESSMENT AND PLAN      Continue PT weightbearing as tolerated. Placement.

## 2022-07-16 NOTE — PROGRESS NOTES
Hospitalist Progress Note   Admit Date:  2022 10:25 AM   Name:  Galo Gutierrez   Age:  66 y.o. Sex:  female  :  1950   MRN:  [id number]   Room:  [facility]/    Presenting Complaint: Hip Pain     Reason(s) for Admission: Closed fracture of right hip, initial encounter [facility]) [S72.001A]  Intertrochanteric fracture of right hip, closed, initial encounter [facility]) [place] [address] Course & Interval History:   Patient is a 66 y.o. female with hx of  HTN, HLD, and CKD stage 3,who presented to the ED for cc right hip pain after a fall unable to bear weight. admitted for R hip fracture. Subjective/24hr Events (22): Feeling well. Has not been out of bed yet. No CP, SOB, fevers. Assessment & Plan:       Intertrochanteric fracture of right hip, closed, initial encounter ([facility].)  22 -dc pérez  -cont PT/OT. can go to SNF when bed arranged  -ASA dvt ppx      ABLA due to operative blood loss  22 -hb a little lower, recheck in AM.    -iron studies with no deficiency. Daily CBC      HTN (hypertension)  -cont metoprolol, norvasc      HLD (hyperlipidemia)  -cont lipitor      Stage 3a chronic kidney disease ([facility].)  22 -BMP at baseline, recheck in AM      Diet:  ADULT DIET; Regular  DVT PPx: per ortho  Code status: Full Code    Hospital Problems:  Principal Problem:    Intertrochanteric fracture of right hip, closed, initial encounter ([facility]  Active Problems:    HTN (hypertension)    HLD (hyperlipidemia)    Stage 3a chronic kidney disease (HCC)    Postoperative anemia due to acute blood loss  Resolved Problems:    * No resolved hospital problems.  *      Objective:     Patient Vitals for the past 24 hrs:   Temp Pulse Resp BP SpO2   22 0746 98.6 °F (37 °C) 77 20 (!) 128/58 97 %   22 0412 97.9 °F (36.6 °C) 70 24 (!) 123/56 97 %   07/15/22 2353 97.7 °F (36.5 °C) 64 18 114/65 99 %   07/15/22 2020 97.7 °F (36.5 °C) 76 18 (!) 118/59 97 %   07/15/22 1612 98.2 °F (36.8 °C) 71 16 (!) 112/54 96 %   07/15/22 1137 98.8 °F (37.1 °C) 90 18 121/64 92 %         Oxygen Therapy  SpO2: 97 %  Pulse Oximetry Type: Continuous  Pulse via Oximetry: 67 beats per minute  Pulse Oximeter Device Mode: Continuous  Pulse Oximeter Device Location: Finger, Left  O2 Device: Nasal cannula  Skin Assessment: Clean, dry, & intact  Skin Protection for O2 Device: No  O2 Flow Rate (L/min): 4 L/min  Oxygen Therapy: None (Room air)    Estimated body mass index is 24.89 kg/m² as calculated from the following:    Height as of this encounter: 5' 4\" (1.626 m). Weight as of this encounter: 145 lb (65.8 kg). Intake/Output Summary (Last 24 hours) at 7/16/2022 1001  Last data filed at 7/16/2022 0630  Gross per 24 hour   Intake 1430 ml   Output 525 ml   Net 905 ml           Physical Exam:     Blood pressure (!) 128/58, pulse 77, temperature 98.6 °F (37 °C), temperature source Oral, resp. rate 20, height 5' 4\" (1.626 m), weight 145 lb (65.8 kg), SpO2 97 %. General:    Well nourished. Head:  Normocephalic, atraumatic  Eyes:  Sclerae appear normal.  Pupils equally round. ENT:  Nares appear normal, no drainage. Moist oral mucosa  Neck:  No restricted ROM. Trachea midline   CV:   RRR. No jugular venous distension. Lungs:   Symmetric expansion. Abdomen:   nondistended. Extremities: No cyanosis or clubbing. No edema  Skin:     No rashes and normal coloration. Warm and dry. Neuro:  CN II-XII grossly intact. Sensation intact. A&Ox3  Psych:  Normal mood and affect.       I have reviewed ordered lab tests and independently visualized imaging below:    Recent Labs:  Recent Results (from the past 48 hour(s))   CBC    Collection Time: 07/15/22  5:01 AM   Result Value Ref Range    WBC 4.8 4.3 - 11.1 K/uL    RBC 3.46 (L) 4.05 - 5.2 M/uL    Hemoglobin 10.3 (L) 11.7 - 15.4 g/dL    Hematocrit 31.7 (L) 35.8 - 46.3 %    MCV 91.6 79.6 - 97.8 FL    MCH 29.8 26.1 - 32.9 PG    MCHC 32.5 31.4 - 35.0 g/dL    RDW 13.4 11.9 - 14.6 % Platelets 091 (L) 420 - 450 K/uL    MPV 11.1 9.4 - 12.3 FL    nRBC 0.00 0.0 - 0.2 K/uL   Basic Metabolic Panel w/ Reflex to MG    Collection Time: 07/15/22  5:01 AM   Result Value Ref Range    Sodium 137 136 - 145 mmol/L    Potassium 4.0 3.5 - 5.1 mmol/L    Chloride 106 98 - 107 mmol/L    CO2 28 21 - 32 mmol/L    Anion Gap 3 (L) 7 - 16 mmol/L    Glucose 112 (H) 65 - 100 mg/dL    BUN 33 (H) 8 - 23 MG/DL    CREATININE 1.30 (H) 0.6 - 1.0 MG/DL    GFR African American 52 (L) >60 ml/min/1.73m2    GFR Non- 43 (L) >60 ml/min/1.73m2    Calcium 8.9 8.3 - 10.4 MG/DL   Iron    Collection Time: 07/15/22  5:01 AM   Result Value Ref Range    Iron 29 (L) 35 - 150 ug/dL   Transferrin Saturation    Collection Time: 07/15/22  5:01 AM   Result Value Ref Range    Iron 28 (L) 35 - 150 ug/dL    TIBC 294 250 - 450 ug/dL    TRANSFERRIN SATURATION 10 (L) >20 %   Ferritin    Collection Time: 07/15/22  5:01 AM   Result Value Ref Range    Ferritin 534 (H) 8 - 388 NG/ML   CBC    Collection Time: 07/16/22  4:04 AM   Result Value Ref Range    WBC 6.3 4.3 - 11.1 K/uL    RBC 2.64 (L) 4.05 - 5.2 M/uL    Hemoglobin 8.0 (L) 11.7 - 15.4 g/dL    Hematocrit 24.4 (L) 35.8 - 46.3 %    MCV 92.4 79.6 - 97.8 FL    MCH 30.3 26.1 - 32.9 PG    MCHC 32.8 31.4 - 35.0 g/dL    RDW 13.2 11.9 - 14.6 %    Platelets 89 (L) 971 - 450 K/uL    MPV 11.8 9.4 - 12.3 FL    nRBC 0.00 0.0 - 0.2 K/uL   Basic Metabolic Panel w/ Reflex to MG    Collection Time: 07/16/22  4:04 AM   Result Value Ref Range    Sodium 138 136 - 145 mmol/L    Potassium 4.2 3.5 - 5.1 mmol/L    Chloride 109 (H) 98 - 107 mmol/L    CO2 28 21 - 32 mmol/L    Anion Gap 1 (L) 7 - 16 mmol/L    Glucose 114 (H) 65 - 100 mg/dL    BUN 37 (H) 8 - 23 MG/DL    CREATININE 1.20 (H) 0.6 - 1.0 MG/DL    GFR  57 (L) >60 ml/min/1.73m2    GFR Non- 47 (L) >60 ml/min/1.73m2    Calcium 8.3 8.3 - 10.4 MG/DL       Other Studies:  XR HIP RIGHT (2-3 VIEWS)   Final Result   FINDINGS / IMPRESSION: Nail through the femoral neck and long intramedullary kevin   have been placed reducing and transfixing the intertrochanteric fracture. NC XR TECHNOLOGIST SERVICE   Final Result      XR HIP RIGHT (2-3 VIEWS)   Final Result      1. Comminuted right intertrochanteric femoral neck fracture. XR FEMUR RIGHT (MIN 2 VIEWS)   Final Result      1. Intertrochanteric right femoral neck fracture. XR CHEST PORTABLE   Final Result      1. No acute cardiopulmonary process. CPT code(s) 84675                      Current Meds:  Current Facility-Administered Medications   Medication Dose Route Frequency    morphine injection 4 mg  4 mg IntraVENous Q1H PRN    sodium chloride flush 0.9 % injection 5-40 mL  5-40 mL IntraVENous 2 times per day    sodium chloride flush 0.9 % injection 5-40 mL  5-40 mL IntraVENous PRN    0.9 % sodium chloride infusion   IntraVENous PRN    ondansetron (ZOFRAN-ODT) disintegrating tablet 4 mg  4 mg Oral Q8H PRN    Or    ondansetron (ZOFRAN) injection 4 mg  4 mg IntraVENous Q6H PRN    aspirin EC tablet 325 mg  325 mg Oral Daily    atorvastatin (LIPITOR) tablet 40 mg  40 mg Oral Nightly    amLODIPine (NORVASC) tablet 10 mg  10 mg Oral Nightly    metoprolol tartrate (LOPRESSOR) tablet 75 mg  75 mg Oral BID    sodium chloride flush 0.9 % injection 5-40 mL  5-40 mL IntraVENous 2 times per day    ondansetron (ZOFRAN-ODT) disintegrating tablet 4 mg  4 mg Oral Q8H PRN    Or    ondansetron (ZOFRAN) injection 4 mg  4 mg IntraVENous Q6H PRN    polyethylene glycol (GLYCOLAX) packet 17 g  17 g Oral Daily PRN    acetaminophen (TYLENOL) tablet 650 mg  650 mg Oral Q6H PRN    Or    acetaminophen (TYLENOL) suppository 650 mg  650 mg Rectal Q6H PRN    oxyCODONE (ROXICODONE) immediate release tablet 5 mg  5 mg Oral Q4H PRN       Signed:  Di Guerrero MD    Part of this note may have been written by using a voice dictation software.   The note has been proof read but may still contain some grammatical/other typographical errors.

## 2022-07-16 NOTE — PROGRESS NOTES
PHYSICAL THERAPY Initial Assessment  (Link to Caseload Tracking: PT Visit Days : 1  Acknowledge Orders  Time In/Out  PT Charge Capture  Rehab Caseload Tracker    Malachi Silveira is a 66 y.o. female   PRIMARY DIAGNOSIS: Intertrochanteric fracture of right hip, closed, initial encounter ([facility]  Closed fracture of right hip, initial encounter ([facility] [S72.001A]  Intertrochanteric fracture of right hip, closed, initial encounter ([facility] [S72.141A]  Procedure(s) (LRB):  RIGHT GAMMA NAIL (Right)  1 Day Post-Op  Reason for Referral: Pain in Right Hip (M25.551)  Stiffness of Right Hip, Not elsewhere classified (M25.651)  Difficulty in walking, Not elsewhere classified (R26.2)  Other abnormalities of gait and mobility (R26.89)  Inpatient: Payor: [facility] / Plan: [place] / Product Type: *No Product type* /     ASSESSMENT:     REHAB RECOMMENDATIONS:   Recommendation to date pending progress:  Setting:  Home Health Therapy    Equipment:    To Be Determined     ASSESSMENT:  Ms. Dakota Trinh is a pleasant elderly female with above diagnosis who demonstrates with decreased transfers, ambulation and mobility below her prior functional baseline. All transfers are currently limited at [address] x 1 with occasional MIN A x 1 boost followed by ambulation with 2 wheel rolling walker for 5 feet with the deficits stated below in the AM.  Limited right LE and foot clearance. Her standing balance is fair to good. Jessica NICHOLAS'Kumar then she is seated in the bedside chair with Min A x 1.   4 liters 02 nasal cannula. Skilled PT is indicated for this patient's functional mobility deficits.    [address] AM-PAC 6 Clicks Basic Mobility Ipatient Short Form  AM-PAC Mobility Inpatient   How much difficulty turning over in bed?: A Little  How much difficulty sitting down on / standing up from a chair with arms?: A Little  How much difficulty moving from lying on back to sitting on side of bed?: A Little  How much help from another person moving to and from a bed to a chair?: A Little  How much help from another person needed to walk in hospital room?: A Little  How much help from another person for climbing 3-5 steps with a railing?: A Lot  AM-PAC Inpatient Mobility Raw Score : 17  AM-PAC Inpatient T-Scale Score : 42.13  Mobility Inpatient CMS 0-100% Score: 50.57  Mobility Inpatient CMS G-Code Modifier : CK    SUBJECTIVE:   Ms. Junior states, \"I am trying to  this left leg more. \"     Social/Functional Lives With: Spouse  Type of Home: House  Home Layout: One level  Home Access: Stairs to enter without rails (1 step)  Entrance Stairs - Number of Steps: 1  Bathroom Shower/Tub: Tub/Shower unit  Bathroom Toilet: Standard  Bathroom Accessibility: Accessible  Home Equipment: Reford Eans  Has the patient had two or more falls in the past year or any fall with injury in the past year?: Yes  Receives Help From: Family  ADL Assistance: [address] [place] [address] Avenue: Independent  Homemaking Responsibilities: Yes  Meal Prep Responsibility: Primary  Laundry Responsibility: Primary  Cleaning Responsibility: Primary  Bill Paying/Finance Responsibility: Primary  Shopping Responsibility: Primary  Health Care Management: Primary  Ambulation Assistance: Independent  Transfer Assistance: Independent  Active : No  Patient's  Info: Holger -  or Valorie Gonzalez - Son  Mode of Transportation: Truck  Occupation: Retired    OBJECTIVE:     PAIN: VITALS / O2: PRECAUTION / Terrilyn Banker / Rachna Hidden:   Pre Treatment:  no specific pain 0/10 reported with transfers and ambulation today. Post Treatment: 0/10 no pain reported. Vitals        Oxygen 4 liters 02 nasal cannula       IV    RESTRICTIONS/PRECAUTIONS:  Restrictions/Precautions: Fall Risk  Right Lower Extremity Weight Bearing: Weight Bearing As Tolerated              GROSS EVALUATION: Intact Impaired (Comments):   AROM [x]  With LESLY precautions.      PROM [x] Strength [x]  3/5 or less for right LE. Balance [x]  Fair to Good standing balance.       Posture [x] Forward Head  Rounded Shoulders   Sensation [x]     Coordination [x]      Tone [x]     Edema [x]    Activity Tolerance [x] Patient limited by fatigue    []      COGNITION/  PERCEPTION: Intact Impaired (Comments):   Orientation [x]     Vision [x]     Hearing [x]     Cognition  [x]       MOBILITY: I Mod I S SBA CGA Min Mod Max Total  NT x2 Comments:   Bed Mobility    Rolling [] [] [] [] [x] [] [] [] [] [] []    Supine to Sit [] [] [] [] [x] [] [] [] [] [] []    Scooting [] [] [] [] [x] [] [] [] [] [] []    Sit to Supine [] [] [] [] [x] [] [] [] [] [] []    Transfers    Sit to Stand [] [] [] [] [x] [x] [] [] [] [] []    Bed to Chair [] [] [] [] [x] [x] [] [] [] [] []    Stand to Sit [] [] [] [] [x] [x] [] [] [] [] []     [] [] [] [] [] [] [] [] [] [] []    I=Independent, Mod I=Modified Independent, S=Supervision, SBA=Standby Assistance, CGA=Contact Guard Assistance,   Min=Minimal Assistance, Mod=Moderate Assistance, Max=Maximal Assistance, Total=Total Assistance, NT=Not Tested    GAIT: I Mod I S SBA CGA Min Mod Max Total  NT x2 Comments:   Level of Assistance [] [] [] [] [x] [] [] [] [] [] []    Distance 5 feet    DME Rolling Walker    Gait Quality Altered arm swing, Decreased casi , Decreased step clearance, Decreased step length, Path deviations , and Trunk sway increased    Weightbearing Status WBAT right LE Restrictions/Precautions  Restrictions/Precautions: Fall Risk  Lower Extremity Weight Bearing Restrictions  Right Lower Extremity Weight Bearing: Weight Bearing As Tolerated    Stairs      I=Independent, Mod I=Modified Independent, S=Supervision, SBA=Standby Assistance, CGA=Contact Guard Assistance,   Min=Minimal Assistance, Mod=Moderate Assistance, Max=Maximal Assistance, Total=Total Assistance, NT=Not Tested    PLAN:   ACUTE PHYSICAL THERAPY GOALS:   Right LE WBAT   (Developed with and agreed upon by patient and/or caregiver.)  STG:  (1.)Ms. Junior will move from supine to sit and sit to supine , scoot up and down, and roll side to side with STAND BY ASSIST within 4 treatment day(s). (2.)Ms. Junior will transfer from bed to chair and chair to bed with STAND BY ASSIST using the least restrictive device within 4 treatment day(s). (3.)Ms. Junior will ambulate with STAND BY ASSIST for 100 feet with the least restrictive device within 4 treatment day(s). LTG:  (1.)Ms. Junior will move from supine to sit and sit to supine , scoot up and down, and roll side to side in bed with MODIFIED INDEPENDENCE within 7 treatment day(s). (2.)Ms. Junior will transfer from bed to chair and chair to bed with MODIFIED INDEPENDENCE using the least restrictive device within 7 treatment day(s). (3.)Ms. Junior will ambulate with MODIFIED INDEPENDENCE for 250 feet with the least restrictive device within 7 treatment day(s). ________________________________________________________________________________________________     FREQUENCY AND DURATION: BID for duration of hospital stay or until stated goals are met, whichever comes first.    THERAPY PROGNOSIS: Good    PROBLEM LIST:   (Skilled intervention is medically necessary to address:)  Decreased ADL/Functional Activities  Decreased Activity Tolerance  Decreased AROM/PROM  Decreased Balance  Decreased Coordination  Decreased Safety Awareness  Decreased Transfer Abilities INTERVENTIONS PLANNED:   (Benefits and precautions of physical therapy have been discussed with the patient.)  Therapeutic Activity  Therapeutic Exercise/HEP  Neuromuscular Re-education  Gait Training       TREATMENT:   EVALUATION: LOW COMPLEXITY: (Untimed Charge)    TREATMENT:   Therapeutic Activity (25 Minutes):  Therapeutic activity included Rolling, Supine to Sit, Sit to Supine, Scooting, Lateral Scooting, Transfer Training, Ambulation on level ground, Sitting balance , and Standing balance to improve functional Activity tolerance, Balance, Coordination, Mobility, Strength, and ROM.     TREATMENT GRID:  N/A    AFTER TREATMENT PRECAUTIONS: Bed/Chair Locked, Call light within reach, Chair, Needs within reach, RN at bedside, and Side rails x3    INTERDISCIPLINARY COLLABORATION:  RN/ PCT and PT/ PTA    EDUCATION: Education Given To: Patient  Education Provided: Role of Therapy  Education Method: Verbal  Barriers to Learning: None  Education Outcome: Continued education needed    TIME IN/OUT:  Time In: 1102  Time Out: 1 Collin Peacock  Minutes: [address], [place]

## 2022-07-16 NOTE — PROGRESS NOTES
Out of bed most of shift  appetite is ok  she doesn't like the hospital food  drinking water  void moderate amount of clear yellow urine on bedside commode  moving better  requires assist  hip dressing intact

## 2022-07-16 NOTE — PROGRESS NOTES
PHYSICAL THERAPY Initial Assessment  (Link to Caseload Tracking: PT Visit Days : 1  Acknowledge Orders  Time In/Out  PT Charge Capture  Rehab Caseload Tracker    Hussain Leyva is a 66 y.o. female   PRIMARY DIAGNOSIS: Intertrochanteric fracture of right hip, closed, initial encounter ([facility] [address]  Closed fracture of right hip, initial encounter ([facility] [address] [S72.001A]  Intertrochanteric fracture of right hip, closed, initial encounter ([facility] [address] [S72.141A]  Procedure(s) (LRB):  RIGHT GAMMA NAIL (Right)  1 Day Post-Op  Reason for Referral: Pain in Right Hip (M25.551)  Stiffness of Right Hip, Not elsewhere classified (M25.651)  Difficulty in walking, Not elsewhere classified (R26.2)  Other abnormalities of gait and mobility (R26.89)  Inpatient: Payor: Gautam Rear / Plan: Lesta Splinter / Product Type: *No Product type* /     ASSESSMENT:     REHAB RECOMMENDATIONS:   Recommendation to date pending progress:  Setting:  Home Health Therapy    Equipment:    To Be Determined     ASSESSMENT:  Ms. Cayla Griffith is a pleasant elderly female with above diagnosis who demonstrates with decreased transfers, ambulation and mobility below her prior functional baseline. All transfers are currently limited at [address] x 1 with occasional MIN A x 1 boost followed by ambulation with 2 wheel rolling walker for 5 feet with the deficits stated below in the AM.  Limited right LE and foot clearance. Her standing balance is fair to good. Jessica YU'Kumar then she is seated in the bedside chair with Min A x 1.   4 liters 02 nasal cannula. Skilled PT is indicated for this patient's functional mobility deficits. PM treatment :    In seated and desires to remain for rest of afternoon, all transfers are currently CGA x 1 to MIN a x 1 to stand with fair to good balance, then ambulation with a 2 wheel rolling walker for 30 feet in room with improved right LE foot clearance and improved stability with standing and ambulation.     Recommend HHPT with Treatment:  no specific pain 0/10 reported with transfers and ambulation today. Post Treatment: 0/10 no pain reported. Vitals        Oxygen 4 liters 02 nasal cannula       IV    RESTRICTIONS/PRECAUTIONS:  Restrictions/Precautions: Fall Risk  Right Lower Extremity Weight Bearing: Weight Bearing As Tolerated              GROSS EVALUATION: Intact Impaired (Comments):   AROM [x]  With LESLY precautions. PROM [x]    Strength [x]  3/5 or less for right LE. Balance [x]  Fair to Good standing balance.       Posture [x] Forward Head  Rounded Shoulders   Sensation [x]     Coordination [x]      Tone [x]     Edema [x]    Activity Tolerance [x] Patient limited by fatigue    []      COGNITION/  PERCEPTION: Intact Impaired (Comments):   Orientation [x]     Vision [x]     Hearing [x]     Cognition  [x]       MOBILITY: I Mod I S SBA CGA Min Mod Max Total  NT x2 Comments:   Bed Mobility    Rolling [] [] [] [] [] [] [] [] [] [] []    Supine to Sit [] [] [] [] [] [] [] [] [] [] []    Scooting [] [] [] [] [x] [] [] [] [] [] []    Sit to Supine [] [] [] [] [] [] [] [] [] [] []    Transfers    Sit to Stand [] [] [] [] [x] [x] [] [] [] [] []    Bed to Chair [] [] [] [] [x] [x] [] [] [] [] []    Stand to Sit [] [] [] [] [x] [x] [] [] [] [] []     [] [] [] [] [] [] [] [] [] [] []    I=Independent, Mod I=Modified Independent, S=Supervision, SBA=Standby Assistance, CGA=Contact Guard Assistance,   Min=Minimal Assistance, Mod=Moderate Assistance, Max=Maximal Assistance, Total=Total Assistance, NT=Not Tested    GAIT: I Mod I S SBA CGA Min Mod Max Total  NT x2 Comments:   Level of Assistance [] [] [] [] [x] [] [] [] [] [] []    [id number] 30 feet    DME Rolling Walker    Gait Quality Altered arm swing, Decreased casi , Decreased step clearance, Decreased step length, Path deviations , and Trunk sway increased    Weightbearing Status WBAT right LE Restrictions/Precautions  Restrictions/Precautions: Fall Risk  Lower Extremity Weight Bearing Restrictions  Right Lower Extremity Weight Bearing: Weight Bearing As Tolerated    Stairs      I=Independent, Mod I=Modified Independent, S=Supervision, SBA=Standby Assistance, CGA=Contact Guard Assistance,   Min=Minimal Assistance, Mod=Moderate Assistance, Max=Maximal Assistance, Total=Total Assistance, NT=Not Tested    PLAN:   ACUTE PHYSICAL THERAPY GOALS:   Right LE WBAT   (Developed with and agreed upon by patient and/or caregiver.)  STG:  (1.)Ms. Junior will move from supine to sit and sit to supine , scoot up and down, and roll side to side with STAND BY ASSIST within 4 treatment day(s). (2.)Ms. Junior will transfer from bed to chair and chair to bed with STAND BY ASSIST using the least restrictive device within 4 treatment day(s). (3.)Ms. Junior will ambulate with STAND BY ASSIST for 100 feet with the least restrictive device within 4 treatment day(s). LTG:  (1.)Ms. Junior will move from supine to sit and sit to supine , scoot up and down, and roll side to side in bed with MODIFIED INDEPENDENCE within 7 treatment day(s). (2.)Ms. Junior will transfer from bed to chair and chair to bed with MODIFIED INDEPENDENCE using the least restrictive device within 7 treatment day(s). (3.)Ms. Junior will ambulate with MODIFIED INDEPENDENCE for 250 feet with the least restrictive device within 7 treatment day(s).   ________________________________________________________________________________________________     FREQUENCY AND DURATION: BID for duration of hospital stay or until stated goals are met, whichever comes first.    THERAPY PROGNOSIS: Good    PROBLEM LIST:   (Skilled intervention is medically necessary to address:)  Decreased ADL/Functional Activities  Decreased Activity Tolerance  Decreased AROM/PROM  Decreased Balance  Decreased Coordination  Decreased Safety Awareness  Decreased Transfer Abilities INTERVENTIONS PLANNED:   (Benefits and precautions of physical therapy have

## 2022-07-16 NOTE — PROGRESS NOTES
OCCUPATIONAL THERAPY Initial Assessment, Daily Note, and AM       OT Visit Days: 1  Acknowledge Orders  Time  OT Charge Capture  Rehab Caseload Tracker    WBAT R CIERA Jones is a 66 y.o. female   PRIMARY DIAGNOSIS: Intertrochanteric fracture of right hip, closed, initial encounter ([facility] [address]  Closed fracture of right hip, initial encounter ([facility] [address] [S72.001A]  Intertrochanteric fracture of right hip, closed, initial encounter ([facility] [address] [S72.141A]  Procedure(s) (LRB):  RIGHT GAMMA NAIL (Right)  1 Day Post-Op  Reason for Referral: Pain in Right Hip (M25.551)  Stiffness of Right Hip, Not elsewhere classified (M25.651)  Difficulty in walking, Not elsewhere classified (R26.2)  Other abnormalities of gait and mobility (R26.89)  Generalized Muscle Weakness (M62.81)  Other lack of cordination (R27.8)  History of falling (Z91.81)  Inpatient: Payor: Malgorzata Valenzuela / Plan: Carlin Halsted / Product Type: *No Product type* /     ASSESSMENT:     REHAB RECOMMENDATIONS:   Recommendation to date pending progress:  Setting:  Short-term Rehab  Pending progress    Equipment:    3 in 1 Bedside Commode  Rolling Walker     ASSESSMENT:  Ms. Gaye Inman is a 67 YO R dominant WF admitted s/p fall in her kitchen while prepping and shucking corn resulting in R femur fracture. Is s/p R IM gamma nail by Dr Virgilio Walker, [place] [facility]. Pt normally independent with all ADLs, IADLs, ambulation with no DME, driving, active. Lives with spouse in 1 level home with 1 step at entrance. Has T/S combo with  nozzle. Has SPC and WC. No other falls. Today, pt sitting up in recliner. Agreeable to therapy evaluation. Pt's spouse and son entered mid way through the evaluation. Pt had already bathed with CNA, but was agreeable to grooming at sink level. Pt on 4L NC O2 and sats reading 96%, so removed to room air. Checked sats after 5 mins and pt had dropped to low to mid 80s.  Reapplied at 2L and instructed pt on purse lip breathing, but needed 4L and more than 3 mins to fully recover >92%. OT got O2 tubing extension, sat and rested a few more minutes and pt then sit to stand with CGA to RW. Reported being dizzy, so stood there for several minutes waiting for it to pass. Pt then took small steps with RW and min A to advance RW. Pt had trouble advancing her R LE during gait and her R foot turns out. Pt had trouble correcting it, so OT assisted with R foot during gait. Stood at sink with chair behind her for grooming tasks, in sitting and standing. Pt reports she had some trouble with her feet prior to this surgery from some prior R hip surgery but \"that she managed\". Educated on LB DME to increase independence with verbal understanding but spouse reports he will help. Pt left up in recliner with family at bedside, needs met and in reach. Cautioned pt to not get up by herself. Pt still mid 90s on 4 L NC at end of session. B UEs are WFLs for basic self care tasks. Pt is functioning below her baseline and could benefit from skilled OT to address her deficits and maximize her independence, safety and activity tolerance for ADLs and functional mobility. Pt may need STR if she does not progress with her ambulation. Pt [facility]. Pt will need RW and 3 in 1 BSC. [address] [facility] [address] [id number] AM-PAC 6 Clicks Daily Activity Inpatient Short Form:    AM-PAC Daily Activity Inpatient   How much help for putting on and taking off regular lower body clothing?: A Lot  How much help for Bathing?: A Lot  How much help for Toileting?: A Lot  How much help for putting on and taking off regular upper body clothing?: A Little  How much help for taking care of personal grooming?: A Little  How much help for eating meals?: None  AM-PAC Inpatient Daily Activity Raw Score: 16  AM-PAC Inpatient ADL T-Scale Score : 35.96  ADL Inpatient CMS 0-100% Score: 53.32  ADL Inpatient CMS G-Code Modifier : CK         SUBJECTIVE:     Ms. Junior states, \"My R foot has always turned out some Comments)   Orientation [x]     Vision []  Wear glasses   Hearing [x]     Cognition  [x]     Perception [x]       MOBILITY: I Mod I S SBA CGA Min Mod Max Total  NT x2 Comments:   Bed Mobility    Rolling [] [] [] [] [] [] [] [] [] [x] []    Supine to Sit [] [] [] [] [] [] [] [] [] [x] []    Scooting [] [] [] [] [] [] [] [] [] [x] []    Sit to Supine [] [] [] [] [] [] [] [] [] [x] []    Transfers    Sit to Stand [] [] [] [] [] [x] [] [] [] [] []    Bed to Chair [] [] [] [] [] [x] [] [] [] [] []    Stand to Sit [] [] [] [] [] [x] [] [] [] [] []    Tub/Shower [] [] [] [] [] [] [] [] [] [x] []     Toilet [] [] [] [] [] [] [] [] [] [x] []      [] [] [] [] [] [] [] [] [] [] []    I=Independent, Mod I=Modified Independent, S=Supervision/Setup, SBA=Standby Assistance, CGA=Contact Guard Assistance, Min=Minimal Assistance, Mod=Moderate Assistance, Max=Maximal Assistance, Total=Total Assistance, NT=Not Tested    ACTIVITIES OF DAILY LIVING: I Mod I S SBA CGA Min Mod Max Total NT Comments   BASIC ADLs:              Upper Body Bathing  [] [] [] [] [] [] [] [] [] [x]    Lower Body Bathing [] [] [] [] [] [] [] [x] [] []    Toileting [] [] [] [] [] [] [] [] [] []    Upper Body Dressing [] [] [] [] [] [] [] [] [] [x]    Lower Body Dressing [] [] [] [] [] [] [] [x] [] []    Feeding [] [] [x] [] [] [] [] [] [] []    Grooming [] [] [] [x] [] [] [] [] [] [] In sitting and standing at sink level   Personal Device Care [] [] [] [] [] [] [] [] [] []    Functional Mobility [] [] [] [] [] [x] [] [] [] [] With RW   I=Independent, Mod I=Modified Independent, S=Supervision/Setup, SBA=Standby Assistance, CGA=Contact Guard Assistance, Min=Minimal Assistance, Mod=Moderate Assistance, Max=Maximal Assistance, Total=Total Assistance, NT=Not Tested    PLAN:     FREQUENCY/DURATION   OT Plan of Care: 6 times/week for duration of hospital stay or until stated goals are met, whichever comes first.    ACUTE OCCUPATIONAL THERAPY GOALS:   (Developed with and agreed upon by patient and/or caregiver.)  1. Patient will complete functional transfers with mod I and adaptive equipment as needed. 2. Patient will complete grooming tasks in standing at sink level. 3. Patient will complete lower body bathing and dressing with mod I and adaptive equipment as needed. 4. Patient will complete toileting with mod I.   5. Patient will tolerate at least 23 minutes of BUE therapeutic exercises to increase strength in BUE to aid in functional transfers. 6. Patient will tolerate at least 10 minutes of OT treatment with no rest breaks to increase activity tolerance for ADLs. Timeframe: 7 visits        PROBLEM LIST:   (Skilled intervention is medically necessary to address:)  Decreased ADL/Functional Activities  Decreased Activity Tolerance  Decreased AROM/PROM  Decreased Balance  Decreased Coordination  Decreased Gait Ability  Decreased Safety Awareness  Decreased Strength  Decreased Transfer Abilities  Increased Pain   INTERVENTIONS PLANNED:  (Benefits and precautions of occupational therapy have been discussed with the patient.)  Self Care Training  Therapeutic Activity  Therapeutic Exercise/HEP  Neuromuscular Re-education  Manual Therapy  Education         TREATMENT:     EVALUATION: MODERATE COMPLEXITY: (Untimed Charge)    TREATMENT:   Self Care (40 minutes): Patient participated in toileting, upper body dressing, lower body dressing, self feeding, and grooming ADLs in supported sitting, unsupported sitting, and standing with moderate visual, verbal, manual, and tactile cueing to increase independence, decrease assistance required, increase activity tolerance, and increase safety awareness. Patient also participated in functional mobility, functional transfer, energy conservation, and adaptive equipment training to increase independence, decrease assistance required, increase activity tolerance, and increase safety awareness.      TREATMENT GRID:  N/A    AFTER TREATMENT PRECAUTIONS: Bed/Chair Locked, Call light within reach, Chair, Needs within reach, RN notified, and Visitors at bedside    INTERDISCIPLINARY COLLABORATION:  RN/ PCT, PT/ PTA, OT/ BARAJAS, and RN Case Manager/      EDUCATION:  Education Given To: Patient; Family  Education Provided: Role of Therapy;Plan of Care;Home Exercise Program;Precautions; ADL Adaptive Strategies;Transfer Training;Energy Conservation;IADL Safety; Family Education;Equipment; Fall Prevention Strategies  Education Method: Demonstration;Verbal  Barriers to Learning: None  Education Outcome: Verbalized understanding;Demonstrated understanding;Continued education needed    TOTAL TREATMENT DURATION AND TIME:  Time In: 1000  Time Out: [address]  Minutes: 36    PINKY JOE, OT   Pinky Joe, MS, OTR/L

## 2022-07-16 NOTE — PROGRESS NOTES
Still requiring 4L oxygen to keep saturations above 90%  patient introduced to incentive spirometer  good effort

## 2022-07-17 LAB
ANION GAP SERPL CALC-SCNC: 2 MMOL/L (ref 7–16)
BUN SERPL-MCNC: 50 MG/DL (ref 8–23)
CALCIUM SERPL-MCNC: 7.8 MG/DL (ref 8.3–10.4)
CHLORIDE SERPL-SCNC: 107 MMOL/L (ref 98–107)
CO2 SERPL-SCNC: 30 MMOL/L (ref 21–32)
CREAT SERPL-MCNC: 1.3 MG/DL (ref 0.6–1)
ERYTHROCYTE [DISTWIDTH] IN BLOOD BY AUTOMATED COUNT: 13.5 % (ref 11.9–14.6)
GLUCOSE SERPL-MCNC: 100 MG/DL (ref 65–100)
HCT VFR BLD AUTO: 22.2 % (ref 35.8–46.3)
HGB BLD-MCNC: 7.1 G/DL (ref 11.7–15.4)
MCH RBC QN AUTO: 30.3 PG (ref 26.1–32.9)
MCHC RBC AUTO-ENTMCNC: 32 G/DL (ref 31.4–35)
MCV RBC AUTO: 94.9 FL (ref 79.6–97.8)
NRBC # BLD: 0 K/UL (ref 0–0.2)
PLATELET # BLD AUTO: 84 K/UL (ref 150–450)
PMV BLD AUTO: 12 FL (ref 9.4–12.3)
POTASSIUM SERPL-SCNC: 4.2 MMOL/L (ref 3.5–5.1)
RBC # BLD AUTO: 2.34 M/UL (ref 4.05–5.2)
SODIUM SERPL-SCNC: 139 MMOL/L (ref 136–145)
WBC # BLD AUTO: 4.8 K/UL (ref 4.3–11.1)

## 2022-07-17 PROCEDURE — 6370000000 HC RX 637 (ALT 250 FOR IP): Performed by: ORTHOPAEDIC SURGERY

## 2022-07-17 PROCEDURE — 2580000003 HC RX 258: Performed by: ORTHOPAEDIC SURGERY

## 2022-07-17 PROCEDURE — 80048 BASIC METABOLIC PNL TOTAL CA: CPT

## 2022-07-17 PROCEDURE — 97530 THERAPEUTIC ACTIVITIES: CPT

## 2022-07-17 PROCEDURE — 97110 THERAPEUTIC EXERCISES: CPT

## 2022-07-17 PROCEDURE — 85027 COMPLETE CBC AUTOMATED: CPT

## 2022-07-17 PROCEDURE — 36415 COLL VENOUS BLD VENIPUNCTURE: CPT

## 2022-07-17 PROCEDURE — 1100000000 HC RM PRIVATE

## 2022-07-17 RX ADMIN — SODIUM CHLORIDE, PRESERVATIVE FREE 10 ML: 5 INJECTION INTRAVENOUS at 08:57

## 2022-07-17 RX ADMIN — METOPROLOL TARTRATE 75 MG: 25 TABLET, FILM COATED ORAL at 22:19

## 2022-07-17 RX ADMIN — OXYCODONE 5 MG: 5 TABLET ORAL at 13:46

## 2022-07-17 RX ADMIN — ATORVASTATIN CALCIUM 40 MG: 40 TABLET, FILM COATED ORAL at 22:19

## 2022-07-17 RX ADMIN — METOPROLOL TARTRATE 75 MG: 25 TABLET, FILM COATED ORAL at 08:57

## 2022-07-17 RX ADMIN — ASPIRIN 325 MG: 325 TABLET, COATED ORAL at 08:57

## 2022-07-17 RX ADMIN — SODIUM CHLORIDE, PRESERVATIVE FREE 5 ML: 5 INJECTION INTRAVENOUS at 22:19

## 2022-07-17 RX ADMIN — SODIUM CHLORIDE, PRESERVATIVE FREE 5 ML: 5 INJECTION INTRAVENOUS at 22:21

## 2022-07-17 RX ADMIN — AMLODIPINE BESYLATE 10 MG: 10 TABLET ORAL at 22:19

## 2022-07-17 ASSESSMENT — PAIN DESCRIPTION - ORIENTATION: ORIENTATION: RIGHT

## 2022-07-17 ASSESSMENT — PAIN DESCRIPTION - LOCATION: LOCATION: KNEE

## 2022-07-17 ASSESSMENT — PAIN SCALES - GENERAL
PAINLEVEL_OUTOF10: 0
PAINLEVEL_OUTOF10: 0
PAINLEVEL_OUTOF10: 4

## 2022-07-17 ASSESSMENT — PAIN DESCRIPTION - DESCRIPTORS: DESCRIPTORS: BURNING;ACHING

## 2022-07-17 NOTE — PROGRESS NOTES
Alert and oriented  denies pain  says right hip is stiff  oxygen at 4l via cannula  incentive spirometer in use

## 2022-07-17 NOTE — PROGRESS NOTES
PHYSICAL THERAPY Daily Note and AM  (Link to Caseload Tracking: PT Visit Days : 2  Time In/Out PT Charge Capture  Rehab Caseload Tracker  Orders      Kenneth Lopez is a 66 y.o. female   PRIMARY DIAGNOSIS: Intertrochanteric fracture of right hip, closed, initial encounter ([facility] [address]  Closed fracture of right hip, initial encounter ([facility] [address] [S72.001A]  Intertrochanteric fracture of right hip, closed, initial encounter ([facility] [address] [S72.141A]  Procedure(s) (LRB):  RIGHT GAMMA NAIL (Right)  2 Days Post-Op  Inpatient: Payor: Lawyer Rajput / Plan: Temi Torres / Product Type: *No Product type* /     ASSESSMENT:     REHAB RECOMMENDATIONS:   Recommendation to date pending progress:  Setting:  Home Health Therapy    Equipment:    To Be Determined     ASSESSMENT:  Ms. Junior presents in supine, agreeable to therapy. Upon entering, Pnt is agreeable to PT treatment. she reports 2/10 pain in her leg at rest. Pnt performed supine > sit with CGA, sitting EOB with good sitting balance control. Sit > stand with CGA using RW. Gait x 5, 2 x 30 ft with CGA and RW, cues for step length. Gait is noted to be slowed and antalgic. Stand > sit with CGA, followed by positioning for comfort. At end of session pt up in bedside chair with all needs within reach, alarm activated for safety, RN notified. Overall, good progress today as pnt improved in mobility. Pnt continues to present as functioning below her baseline, with deficits in mobility including transfers, gait, balance, and activity tolerance. Pt will continue to benefit from skilled therapy services to address stated deficits to promote return to highest level of function, independence, and safety. Will continue to follow. SUBJECTIVE:   Ms. Junior states, \"I'm doing better today\"     Social/Functional Lives With: Spouse  Type of Home: House  Home Layout: One level  Home Access: Stairs to enter without rails (1 step)  Entrance Stairs - Number of Steps: 1  Bathroom Shower/Tub: Tub/Shower unit  Bathroom Toilet: Standard  Bathroom Accessibility: Accessible  Home Equipment: Emmetta Marty  Has the patient had two or more falls in the past year or any fall with injury in the past year?: Yes  Receives Help From: Family  ADL Assistance: [address]: [address] [facility] Responsibilities: Yes  Meal Prep Responsibility: Primary  Laundry Responsibility: Primary  Cleaning Responsibility: Primary  Bill Paying/Finance Responsibility: Primary  Shopping Responsibility: Primary  Health Care Management: Primary  Ambulation Assistance: Independent  Transfer Assistance: Independent  Active : No  Patient's  Info: Holger -  or Antoinette Bright - Son  Mode of Transportation: Truck  Occupation: Retired  OBJECTIVE:     PAIN: VITALS / O2: PRECAUTION / Nayely Irmay / [place] Bath:   Pre Treatment:  2/10         Post Treatment: 2/10 Vitals        Oxygen    IV    RESTRICTIONS/PRECAUTIONS:  Restrictions/Precautions  Restrictions/Precautions: Fall Risk  Lower Extremity Weight Bearing Restrictions  Right Lower Extremity Weight Bearing: Weight Bearing As Tolerated  Restrictions/Precautions: Fall Risk     MOBILITY: I Mod I S SBA CGA Min Mod Max Total  NT x2 Comments:   Bed Mobility    Rolling [] [] [] [] [x] [] [] [] [] [] []    Supine to Sit [] [] [] [] [x] [] [] [] [] [] []    Scooting [] [] [] [] [x] [] [] [] [] [] []    Sit to Supine [] [] [] [] [] [] [] [] [] [x] []    Transfers    Sit to Stand [] [] [] [] [x] [] [] [] [] [] []    Bed to Chair [] [] [] [] [x] [] [] [] [] [] []    Stand to Sit [] [] [] [] [x] [] [] [] [] [] []     [] [] [] [] [] [] [] [] [] [] []    I=Independent, Mod I=Modified Independent, S=Supervision, SBA=Standby Assistance, CGA=Contact Guard Assistance,   Min=Minimal Assistance, Mod=Moderate Assistance, Max=Maximal Assistance, Total=Total Assistance, NT=Not Tested    BALANCE: Good Fair+ Fair Fair- Poor NT Comments   Sitting Static [x] [] [] [] [] []    Sitting Dynamic [] [x] [] [] [] []              Standing Static [] [x] [] [] [] []    Standing Dynamic [] [x] [] [] [] []      GAIT: I Mod I S SBA CGA Min Mod Max Total  NT x2 Comments:   Level of Assistance [] [] [] [] [x] [] [] [] [] [] []    Distance 5, 3 x 20 feet    DME Gait Belt and Rolling Walker    Gait Quality Antalgic    Weightbearing Status      Stairs      I=Independent, Mod I=Modified Independent, S=Supervision, SBA=Standby Assistance, CGA=Contact Guard Assistance,   Min=Minimal Assistance, Mod=Moderate Assistance, Max=Maximal Assistance, Total=Total Assistance, NT=Not Tested    PLAN:   ACUTE PHYSICAL THERAPY GOALS:   (Developed with and agreed upon by patient and/or caregiver.)  STG:  (1.)Ms. Junior will move from supine to sit and sit to supine , scoot up and down, and roll side to side with STAND BY ASSIST within 4 treatment day(s). (2.)Ms. Junior will transfer from bed to chair and chair to bed with STAND BY ASSIST using the least restrictive device within 4 treatment day(s). (3.)Ms. Junior will ambulate with STAND BY ASSIST for 100 feet with the least restrictive device within 4 treatment day(s). LTG:  (1.)Ms. Junior will move from supine to sit and sit to supine , scoot up and down, and roll side to side in bed with MODIFIED INDEPENDENCE within 7 treatment day(s). (2.)Ms. Junior will transfer from bed to chair and chair to bed with MODIFIED INDEPENDENCE using the least restrictive device within 7 treatment day(s). (3.)Ms. Junior will ambulate with MODIFIED INDEPENDENCE for 250 feet with the least restrictive device within 7 treatment day(s). FREQUENCY AND DURATION: BID for duration of hospital stay or until stated goals are met, whichever comes first.    TREATMENT:   TREATMENT:   Therapeutic Activity (24 Minutes):  Therapeutic activity included Rolling, Supine to Sit, Scooting, Transfer Training, Ambulation on level ground, Sitting balance , and Standing balance to improve functional Activity tolerance, Balance, Coordination, Mobility, Strength, and ROM.     TREATMENT GRID:  N/A    AFTER TREATMENT PRECAUTIONS: Bed/Chair Locked, Call light within reach, Chair, Needs within reach, and RN notified    INTERDISCIPLINARY COLLABORATION:  RN/ PCT and PT/ PTA    EDUCATION: Education Given To: Patient    TIME IN/OUT:  Time In: 6628  Time Out: [facility]  Minutes: Karlos Howard PT

## 2022-07-17 NOTE — PROGRESS NOTES
immediate release tablet 5 mg, 5 mg, Oral, Q4H PRN    ASSESSMENT AND PLAN      Continue physical therapy weightbearing as tolerated.

## 2022-07-17 NOTE — PROGRESS NOTES
OCCUPATIONAL THERAPY Daily Note     OT Visit Days: 2   Time  OT Charge Capture  Rehab Caseload Tracker  OT Orders    Ildefonso Aceves is a 66 y.o. female   PRIMARY DIAGNOSIS: Intertrochanteric fracture of right hip, closed, initial encounter ([facility]  Closed fracture of right hip, initial encounter ([facility] [S72.001A]  Intertrochanteric fracture of right hip, closed, initial encounter ([facility] Leandra   Procedure(s) (LRB):  RIGHT GAMMA NAIL (Right)  2 Days Post-Op  Inpatient: Payor: Yulia Lomas / Plan: Kasey Castro / Product Type: *No Product type* /     ASSESSMENT:     REHAB RECOMMENDATIONS: CURRENT LEVEL OF FUNCTION:  (Most Recently Demonstrated)   Recommendation to date pending progress:  Setting:  Home Health Therapy    Equipment:    To Be Determined Bathing:  Not Tested  Dressing:  Not Tested  Feeding/Grooming:  Not Tested  Toileting:  Not Tested  Functional Mobility:  Contact Guard Assist     ASSESSMENT:  Ms. Junior was getting back to bed with RN. Pt completed functional transfer with CGA using rolling walker. Pt completed bed mobility with CGA. Pt completed  the exercises below on  B UE's with green thera band. Continue POC. SUBJECTIVE:     Ms. Junior states, \"I need to rest.\"     Social/Functional Lives With: Spouse  Type of Home: House  Home Layout: One level  Home Access: Stairs to enter without rails (1 step)  Entrance Stairs - Number of Steps: 1  Bathroom Shower/Tub: Tub/Shower unit  Bathroom Toilet: Standard  Bathroom Accessibility: Accessible  Home Equipment: Linda Rodriguez  Has the patient had two or more falls in the past year or any fall with injury in the past year?: Yes  Receives Help From: Family  ADL Assistance: [facility] [address]: Independent  Homemaking Responsibilities: Yes  Meal Prep Responsibility: Primary  Laundry Responsibility: Primary  Cleaning Responsibility: Primary  Bill Paying/Finance Responsibility: Primary  Shopping Responsibility: Primary  Health Care Management: Primary  Ambulation Assistance: Independent  Transfer Assistance: Independent  Active : No  Patient's  Info: Lavinia Jane -  or Dominique Montero - Son  Mode of Transportation: Truck  Occupation: Retired    OBJECTIVE:     LINES / Patrisha [employer] / AIRWAY: IV    RESTRICTIONS/PRECAUTIONS:  Restrictions/Precautions  Restrictions/Precautions: Fall Risk  Lower Extremity Weight Bearing Restrictions  Right Lower Extremity Weight Bearing: Weight Bearing As Tolerated        PAIN: Donna Shadia / O2:   Pre Treatment: 0             Post Treatment: 0 Vitals          Oxygen            MOBILITY: I Mod I S SBA CGA Min Mod Max Total  NT x2 Comments:   Bed Mobility    Rolling [] [] [] [] [x] [] [] [] [] [] []    Supine to Sit [] [] [] [] [] [] [] [] [] [] []    Scooting [] [] [] [] [] [] [] [] [] [] []    Sit to Supine [] [] [] [] [x] [] [] [] [] [] []    Transfers    Sit to Stand [] [] [] [] [] [] [] [] [] [] []    Bed to Chair [] [] [] [] [] [] [] [] [] [] []    Stand to Sit [] [] [] [] [] [] [] [] [] [] []    Tub/Shower [] [] [] [] [] [] [] [] [] [] []     Toilet [] [] [] [] [] [] [] [] [] [] []      [] [] [] [] [] [] [] [] [] [] []    I=Independent, Mod I=Modified Independent, S=Supervision/Setup, SBA=Standby Assistance, CGA=Contact Guard Assistance, Min=Minimal Assistance, Mod=Moderate Assistance, Max=Maximal Assistance, Total=Total Assistance, NT=Not Tested    ACTIVITIES OF DAILY LIVING: I Mod I S SBA CGA Min Mod Max Total NT Comments   BASIC ADLs:              Upper Body   Bathing [] [] [] [] [] [] [] [] [] []    Lower Body Bathing [] [] [] [] [] [] [] [] [] []    Toileting [] [] [] [] [] [] [] [] [] []    Upper Body Dressing [] [] [] [] [] [] [] [] [] []    Lower Body Dressing [] [] [] [] [] [] [] [] [] []    Feeding [] [] [] [] [] [] [] [] [] []    Grooming [] [] [] [] [] [] [] [] [] []    Personal Device Care [] [] [] [] [] [] [] [] [] []    Functional Mobility [] [] [] [] [] [] [] [] [] []    I=Independent, Mod I=Modified Independent, S=Supervision/Setup, SBA=Standby Assistance, CGA=Contact Guard Assistance, Min=Minimal Assistance, Mod=Moderate Assistance, Max=Maximal Assistance, Total=Total Assistance, NT=Not Tested    BALANCE: Good Fair+ Fair Fair- Poor NT Comments   Sitting Static [] [] [] [] [] []    Sitting Dynamic [] [] [] [] [] []              Standing Static [] [] [] [] [] []    Standing Dynamic [] [] [] [] [] []        PLAN:     FREQUENCY/DURATION   OT Plan of Care: 6 times/week for duration of hospital stay or until stated goals are met, whichever comes first.    ACUTE OCCUPATIONAL THERAPY GOALS:   (Developed with and agreed upon by patient and/or caregiver.)  1. Patient will complete functional transfers with mod I and adaptive equipment as needed. 2. Patient will complete grooming tasks in standing at sink level. 3. Patient will complete lower body bathing and dressing with mod I and adaptive equipment as needed. 4. Patient will complete toileting with mod I.  5. Patient will tolerate at least 23 minutes of BUE therapeutic exercises to increase strength in BUE to aid in functional transfers. 6. Patient will tolerate at least 10 minutes of OT treatment with no rest breaks to increase activity tolerance for ADLs. TREATMENT:     TREATMENT:   Therapeutic Exercise (15 Minutes): Therapeutic exercises noted below to improve functional AROM and strength.      TREATMENT GRID:  Lucy qiua band  Date:  7/17/22 Date:   Date:     Activity/Exercise Parameters Parameters Parameters   Shoulders flex/ex  10 reps      Punches  10 reps      Shoulder hor abd/add  10 reps                                  AFTER TREATMENT PRECAUTIONS: Bed, Bed/Chair Locked, Call light within reach, Needs within reach, RN notified, and Side rails x3    INTERDISCIPLINARY COLLABORATION:  RN/ PCT and OT/ BARAJAS    EDUCATION:       TOTAL TREATMENT DURATION AND TIME:  Time In: 1400  Time Out: [id number]  Minutes: 15    Mandy Lexa White

## 2022-07-17 NOTE — PROGRESS NOTES
Hospitalist Progress Note   Admit Date:  2022 10:25 AM   Name:  Leela Jane   Age:  66 y.o. Sex:  female  :  1950   MRN:  [id number]   Room:  [facility]/    Presenting Complaint: Hip Pain     Reason(s) for Admission: Closed fracture of right hip, initial encounter [facility]) [S72.001A]  Intertrochanteric fracture of right hip, closed, initial encounter [facility]) [facility] Course & Interval History:   Patient is a 66 y.o. female with hx of  HTN, HLD, and CKD stage 3,who presented to the ED for cc right hip pain after a fall unable to bear weight. admitted for R hip fracture. Subjective/24hr Events (22):   OT recommending SNF but PT rec HH. Pt has no complaints today. No CP, SOB, fevers, bleeding, melena    Assessment & Plan:       Intertrochanteric fracture of right hip, closed, initial encounter ([facility]  22 -rehab vs HH. Final therapy recs pending  -cont PT/OT. -ASA dvt ppx      ABLA due to operative blood loss  22 -hb lower today. Recheck in AM.  May need RBC tx  -iron studies with no deficiency. Daily CBC      HTN (hypertension)  -cont metoprolol, norvasc      HLD (hyperlipidemia)  -cont lipitor      Stage 3a chronic kidney disease ([facility]  22 -BMP at baseline, recheck in AM      Diet:  ADULT DIET; Regular  DVT PPx: per ortho  Code status: Full Code    Hospital Problems:  Principal Problem:    Intertrochanteric fracture of right hip, closed, initial encounter ([facility]  Active Problems:    HTN (hypertension)    HLD (hyperlipidemia)    Stage 3a chronic kidney disease (HCC)    Postoperative anemia due to acute blood loss  Resolved Problems:    * No resolved hospital problems.  *      Objective:     Patient Vitals for the past 24 hrs:   Temp Pulse Resp BP SpO2   22 0800 -- -- -- (!) 116/54 --   22 0738 98.2 °F (36.8 °C) 80 20 (!) 107/47 99 %   22 0530 98.4 °F (36.9 °C) 80 20 109/60 98 %   22 0003 98.4 °F (36.9 °C) 73 16 (!) 117/56 100 % 24.4 (L) 35.8 - 46.3 %    MCV 92.4 79.6 - 97.8 FL    MCH 30.3 26.1 - 32.9 PG    MCHC 32.8 31.4 - 35.0 g/dL    RDW 13.2 11.9 - 14.6 %    Platelets 89 (L) 613 - 450 K/uL    MPV 11.8 9.4 - 12.3 FL    nRBC 0.00 0.0 - 0.2 K/uL   Basic Metabolic Panel w/ Reflex to MG    Collection Time: 07/16/22  4:04 AM   Result Value Ref Range    Sodium 138 136 - 145 mmol/L    Potassium 4.2 3.5 - 5.1 mmol/L    Chloride 109 (H) 98 - 107 mmol/L    CO2 28 21 - 32 mmol/L    Anion Gap 1 (L) 7 - 16 mmol/L    Glucose 114 (H) 65 - 100 mg/dL    BUN 37 (H) 8 - 23 MG/DL    CREATININE 1.20 (H) 0.6 - 1.0 MG/DL    GFR  57 (L) >60 ml/min/1.73m2    GFR Non- 47 (L) >60 ml/min/1.73m2    Calcium 8.3 8.3 - 10.4 MG/DL   CBC    Collection Time: 07/17/22  4:25 AM   Result Value Ref Range    WBC 4.8 4.3 - 11.1 K/uL    RBC 2.34 (L) 4.05 - 5.2 M/uL    Hemoglobin 7.1 (L) 11.7 - 15.4 g/dL    Hematocrit 22.2 (L) 35.8 - 46.3 %    MCV 94.9 79.6 - 97.8 FL    MCH 30.3 26.1 - 32.9 PG    MCHC 32.0 31.4 - 35.0 g/dL    RDW 13.5 11.9 - 14.6 %    Platelets 84 (L) 824 - 450 K/uL    MPV 12.0 9.4 - 12.3 FL    nRBC 0.00 0.0 - 0.2 K/uL   Basic Metabolic Panel w/ Reflex to MG    Collection Time: 07/17/22  4:25 AM   Result Value Ref Range    Sodium 139 136 - 145 mmol/L    Potassium 4.2 3.5 - 5.1 mmol/L    Chloride 107 98 - 107 mmol/L    CO2 30 21 - 32 mmol/L    Anion Gap 2 (L) 7 - 16 mmol/L    Glucose 100 65 - 100 mg/dL    BUN 50 (H) 8 - 23 MG/DL    CREATININE 1.30 (H) 0.6 - 1.0 MG/DL    GFR African American 52 (L) >60 ml/min/1.73m2    GFR Non- 43 (L) >60 ml/min/1.73m2    Calcium 7.8 (L) 8.3 - 10.4 MG/DL       Other Studies:  XR HIP RIGHT (2-3 VIEWS)   Final Result   FINDINGS / IMPRESSION: Nail through the femoral neck and long intramedullary kevin   have been placed reducing and transfixing the intertrochanteric fracture. NC XR TECHNOLOGIST SERVICE   Final Result      XR HIP RIGHT (2-3 VIEWS)   Final Result      1.  Comminuted right intertrochanteric femoral neck fracture. XR FEMUR RIGHT (MIN 2 VIEWS)   Final Result      1. Intertrochanteric right femoral neck fracture. XR CHEST PORTABLE   Final Result      1. No acute cardiopulmonary process. CPT code(s) 68471                      Current Meds:  Current Facility-Administered Medications   Medication Dose Route Frequency    medicated lip ointment (BLISTEX)   Topical PRN    morphine injection 4 mg  4 mg IntraVENous Q1H PRN    sodium chloride flush 0.9 % injection 5-40 mL  5-40 mL IntraVENous 2 times per day    sodium chloride flush 0.9 % injection 5-40 mL  5-40 mL IntraVENous PRN    0.9 % sodium chloride infusion   IntraVENous PRN    ondansetron (ZOFRAN-ODT) disintegrating tablet 4 mg  4 mg Oral Q8H PRN    Or    ondansetron (ZOFRAN) injection 4 mg  4 mg IntraVENous Q6H PRN    aspirin EC tablet 325 mg  325 mg Oral Daily    atorvastatin (LIPITOR) tablet 40 mg  40 mg Oral Nightly    amLODIPine (NORVASC) tablet 10 mg  10 mg Oral Nightly    metoprolol tartrate (LOPRESSOR) tablet 75 mg  75 mg Oral BID    sodium chloride flush 0.9 % injection 5-40 mL  5-40 mL IntraVENous 2 times per day    ondansetron (ZOFRAN-ODT) disintegrating tablet 4 mg  4 mg Oral Q8H PRN    Or    ondansetron (ZOFRAN) injection 4 mg  4 mg IntraVENous Q6H PRN    polyethylene glycol (GLYCOLAX) packet 17 g  17 g Oral Daily PRN    acetaminophen (TYLENOL) tablet 650 mg  650 mg Oral Q6H PRN    Or    acetaminophen (TYLENOL) suppository 650 mg  650 mg Rectal Q6H PRN    oxyCODONE (ROXICODONE) immediate release tablet 5 mg  5 mg Oral Q4H PRN       Signed:  Katherine Bates MD    Part of this note may have been written by using a voice dictation software. The note has been proof read but may still contain some grammatical/other typographical errors.

## 2022-07-18 LAB
ANION GAP SERPL CALC-SCNC: 6 MMOL/L (ref 7–16)
BUN SERPL-MCNC: 49 MG/DL (ref 8–23)
CALCIUM SERPL-MCNC: 8.2 MG/DL (ref 8.3–10.4)
CHLORIDE SERPL-SCNC: 108 MMOL/L (ref 98–107)
CO2 SERPL-SCNC: 26 MMOL/L (ref 21–32)
CREAT SERPL-MCNC: 1.3 MG/DL (ref 0.6–1)
ERYTHROCYTE [DISTWIDTH] IN BLOOD BY AUTOMATED COUNT: 13.6 % (ref 11.9–14.6)
GLUCOSE SERPL-MCNC: 97 MG/DL (ref 65–100)
HCT VFR BLD AUTO: 22.2 % (ref 35.8–46.3)
HGB BLD-MCNC: 7 G/DL (ref 11.7–15.4)
HISTORY CHECK: NORMAL
MCH RBC QN AUTO: 30 PG (ref 26.1–32.9)
MCHC RBC AUTO-ENTMCNC: 31.5 G/DL (ref 31.4–35)
MCV RBC AUTO: 95.3 FL (ref 79.6–97.8)
NRBC # BLD: 0 K/UL (ref 0–0.2)
PLATELET # BLD AUTO: 97 K/UL (ref 150–450)
PMV BLD AUTO: 12.2 FL (ref 9.4–12.3)
POTASSIUM SERPL-SCNC: 4.3 MMOL/L (ref 3.5–5.1)
RBC # BLD AUTO: 2.33 M/UL (ref 4.05–5.2)
SODIUM SERPL-SCNC: 140 MMOL/L (ref 136–145)
WBC # BLD AUTO: 4.3 K/UL (ref 4.3–11.1)

## 2022-07-18 PROCEDURE — 36430 TRANSFUSION BLD/BLD COMPNT: CPT

## 2022-07-18 PROCEDURE — 97530 THERAPEUTIC ACTIVITIES: CPT

## 2022-07-18 PROCEDURE — 97110 THERAPEUTIC EXERCISES: CPT

## 2022-07-18 PROCEDURE — 2580000003 HC RX 258: Performed by: ORTHOPAEDIC SURGERY

## 2022-07-18 PROCEDURE — 1100000000 HC RM PRIVATE

## 2022-07-18 PROCEDURE — 85027 COMPLETE CBC AUTOMATED: CPT

## 2022-07-18 PROCEDURE — 6370000000 HC RX 637 (ALT 250 FOR IP): Performed by: ORTHOPAEDIC SURGERY

## 2022-07-18 PROCEDURE — 97535 SELF CARE MNGMENT TRAINING: CPT

## 2022-07-18 PROCEDURE — 36415 COLL VENOUS BLD VENIPUNCTURE: CPT

## 2022-07-18 PROCEDURE — 86923 COMPATIBILITY TEST ELECTRIC: CPT

## 2022-07-18 PROCEDURE — 86901 BLOOD TYPING SEROLOGIC RH(D): CPT

## 2022-07-18 PROCEDURE — 30233N1 TRANSFUSION OF NONAUTOLOGOUS RED BLOOD CELLS INTO PERIPHERAL VEIN, PERCUTANEOUS APPROACH: ICD-10-PCS | Performed by: INTERNAL MEDICINE

## 2022-07-18 PROCEDURE — 80048 BASIC METABOLIC PNL TOTAL CA: CPT

## 2022-07-18 PROCEDURE — P9016 RBC LEUKOCYTES REDUCED: HCPCS

## 2022-07-18 RX ORDER — METOPROLOL TARTRATE 75 MG/1
75 TABLET, FILM COATED ORAL 2 TIMES DAILY
Qty: 60 TABLET | Refills: 1 | Status: SHIPPED | OUTPATIENT
Start: 2022-07-18

## 2022-07-18 RX ORDER — SODIUM CHLORIDE 9 MG/ML
INJECTION, SOLUTION INTRAVENOUS PRN
Status: DISCONTINUED | OUTPATIENT
Start: 2022-07-18 | End: 2022-07-19 | Stop reason: HOSPADM

## 2022-07-18 RX ORDER — AMLODIPINE BESYLATE 5 MG/1
5 TABLET ORAL NIGHTLY
Qty: 30 TABLET | Refills: 1 | Status: SHIPPED | OUTPATIENT
Start: 2022-07-18

## 2022-07-18 RX ORDER — OXYCODONE HYDROCHLORIDE 5 MG/1
5 TABLET ORAL EVERY 6 HOURS PRN
Qty: 12 TABLET | Refills: 0 | Status: SHIPPED | OUTPATIENT
Start: 2022-07-18 | End: 2022-07-21

## 2022-07-18 RX ORDER — SODIUM CHLORIDE 9 MG/ML
INJECTION, SOLUTION INTRAVENOUS PRN
Status: DISCONTINUED | OUTPATIENT
Start: 2022-07-18 | End: 2022-07-18

## 2022-07-18 RX ORDER — ATORVASTATIN CALCIUM 40 MG/1
40 TABLET, FILM COATED ORAL NIGHTLY
Qty: 30 TABLET | Refills: 1 | Status: SHIPPED | OUTPATIENT
Start: 2022-07-18

## 2022-07-18 RX ADMIN — SODIUM CHLORIDE, PRESERVATIVE FREE 10 ML: 5 INJECTION INTRAVENOUS at 09:00

## 2022-07-18 RX ADMIN — ATORVASTATIN CALCIUM 40 MG: 40 TABLET, FILM COATED ORAL at 21:50

## 2022-07-18 RX ADMIN — ASPIRIN 325 MG: 325 TABLET, COATED ORAL at 08:52

## 2022-07-18 RX ADMIN — SODIUM CHLORIDE, PRESERVATIVE FREE 5 ML: 5 INJECTION INTRAVENOUS at 21:52

## 2022-07-18 RX ADMIN — METOPROLOL TARTRATE 75 MG: 25 TABLET, FILM COATED ORAL at 21:49

## 2022-07-18 RX ADMIN — OXYCODONE 5 MG: 5 TABLET ORAL at 13:44

## 2022-07-18 RX ADMIN — SODIUM CHLORIDE, PRESERVATIVE FREE 10 ML: 5 INJECTION INTRAVENOUS at 14:32

## 2022-07-18 RX ADMIN — METOPROLOL TARTRATE 75 MG: 25 TABLET, FILM COATED ORAL at 08:52

## 2022-07-18 ASSESSMENT — PAIN DESCRIPTION - ORIENTATION
ORIENTATION: LEFT
ORIENTATION: RIGHT

## 2022-07-18 ASSESSMENT — PAIN DESCRIPTION - LOCATION
LOCATION: HIP
LOCATION: LEG

## 2022-07-18 ASSESSMENT — PAIN SCALES - GENERAL
PAINLEVEL_OUTOF10: 0
PAINLEVEL_OUTOF10: 0
PAINLEVEL_OUTOF10: 6
PAINLEVEL_OUTOF10: 1

## 2022-07-18 NOTE — DISCHARGE SUMMARY
Hospitalist Discharge Summary   Admit Date:  2022 10:25 AM   DC Note date: 2022  Name:  Yumi Roblero   Age:  66 y.o. Sex:  female  :  1950   MRN:  [id number]   Room:  [facility]  PCP:  Bernadine Morales MD    Presenting Complaint: Hip Pain     Initial Admission Diagnosis: Closed fracture of right hip, initial encounter [facility]) [S72.001A]  Intertrochanteric fracture of right hip, closed, initial encounter ([facility] [S72.141A]     Problem List for this Hospitalization (present on admission):    Principal Problem:    [facility] of right [facility], [facility]  Active Problems:    HTN (hypertension)    HLD (hyperlipidemia)    Stage 3a chronic kidney disease ([facility]    Postoperative anemia due to acute blood loss  Resolved Problems:    * No resolved hospital problems. *    Did Patient have Sepsis (YES OR NO): no    Hospital Course:  Patient is a 66 y.o. female with hx of  HTN, HLD, and CKD stage 3,who presented to the ED for cc right hip pain after a fall unable to bear weight. admitted for R hip fracture. She tolerated procedure well and therapy recommending home health. However she lost a significant amount of blood so gave 1u RBC yesterday prior to discharge home. Her med rec was not done on admission. Asked nursing to complete. Printing prescriptions of meds given here in case patient needs but she may already have them at home (except the ASA dvt ppx). Disposition:   Diet: ADULT DIET; Regular  Code Status: Full Code    Follow Ups:   Follow-up Information     [facility] Norman Parsons PA Follow up on 2022. Specialties: Orthopedic Surgery, Physician Assistant  Why: 11:15am  Contact information:  [address]  [address] [facility] [facility] [address]  [phone]             Bernadine Morales MD Follow up on 2022.     Specialty: Family Medicine  Why: 10 am  Contact information:  [address] [phone] Time spent in patient discharge and coordination 35 minutes. Follow up labs/diagnostics (ultimately defer to outpatient provider):  CBC, BMP    Plan was discussed with pt. All questions answered. Patient was stable at time of discharge. Instructions given to call a physician or return if any concerns. Current Discharge Medication List        START taking these medications    Details   aspirin 325 MG EC tablet Take 1 tablet by mouth in the morning for 21 days. Qty: 21 tablet, Refills: 0      amLODIPine (NORVASC) 5 MG tablet Take 1 tablet by mouth nightly  Qty: 30 tablet, Refills: 1      metoprolol tartrate 75 MG TABS Take 75 mg by mouth 2 times daily  Qty: 60 tablet, Refills: 1      atorvastatin (LIPITOR) 40 MG tablet Take 1 tablet by mouth nightly  Qty: 30 tablet, Refills: 1      oxyCODONE (ROXICODONE) 5 MG immediate release tablet Take 1 tablet by mouth every 6 hours as needed for Pain for up to 3 days. Qty: 12 tablet, Refills: 0    Comments: Reduce doses taken as pain becomes manageable  Associated Diagnoses: Closed fracture of right hip, initial encounter ([facility]             Procedures done this admission:  Procedure(s):  RIGHT GAMMA NAIL    Consults this admission:  [address]    Echocardiogram results:  No results found for this or any previous visit. Diagnostic Imaging/Tests:   XR HIP RIGHT (2-3 VIEWS)    Result Date: 7/15/2022  FINDINGS / IMPRESSION: Nail through the femoral neck and long intramedullary kevin have been placed reducing and transfixing the intertrochanteric fracture. XR HIP RIGHT (2-3 VIEWS)    Result Date: 7/13/2022  1. Comminuted right intertrochanteric femoral neck fracture. XR FEMUR RIGHT (MIN 2 VIEWS)    Result Date: 7/13/2022  1. Intertrochanteric right femoral neck fracture. XR CHEST PORTABLE    Result Date: 7/13/2022  1. No acute cardiopulmonary process.  CPT code(s) 59174          Labs: Compatible    PREPARE RBC (CROSSMATCH), 1 Units    Collection Time: 07/18/22  1:15 PM   Result Value Ref Range    History Check Historical check performed    Hemoglobin    Collection Time: 07/19/22  3:51 AM   Result Value Ref Range    Hemoglobin 8.4 (L) 11.7 - 15.4 g/dL       Allergies   Allergen Reactions    Allopurinol Shortness Of Breath    Colchicine Rash     Rash and shortness of breath    Red Dye Itching    Yellow Dyes (Non-Tartrazine) Itching     Immunization History   Administered Date(s) Administered    COVID-19, MODERNA BLUE border, Primary or Immunocompromised, (age 12y+), IM, 100 mcg/0.5mL 03/30/2021, 04/27/2021, 10/30/2021       Recent Vital Data:  Patient Vitals for the past 24 hrs:   Temp Pulse Resp BP SpO2   07/19/22 0712 98.8 °F (37.1 °C) 77 17 117/70 94 %   07/19/22 0413 98 °F (36.7 °C) 77 20 (!) 130/57 93 %   07/18/22 2329 98.8 °F (37.1 °C) 70 20 (!) 127/57 90 %   07/18/22 2149 -- 78 -- (!) 134/57 --   07/18/22 1953 99.1 °F (37.3 °C) 78 20 (!) 134/57 93 %   07/18/22 1915 98.2 °F (36.8 °C) 77 18 (!) 123/53 --   07/18/22 1730 98.7 °F (37.1 °C) 77 20 131/60 --   07/18/22 1638 98.2 °F (36.8 °C) 72 18 (!) 121/55 100 %   07/18/22 1622 98.8 °F (37.1 °C) 73 16 (!) 118/50 92 %   07/18/22 1500 98.4 °F (36.9 °C) 78 18 110/69 90 %   07/18/22 1130 97.9 °F (36.6 °C) 71 18 (!) 94/52 93 %       Oxygen Therapy  SpO2: 94 %  Pulse Oximetry Type: Continuous  Pulse via Oximetry: 67 beats per minute  Pulse Oximeter Device Mode: Intermittent  Pulse Oximeter Device Location: Right, Finger  O2 Device: None (Room air)  Skin Assessment: Clean, dry, & intact  Skin Protection for O2 Device: No  O2 Flow Rate (L/min): 4 L/min  Oxygen Therapy: None (Room air)    Estimated body mass index is 24.89 kg/m² as calculated from the following:    Height as of this encounter: 5' 4\" (1.626 m). Weight as of this encounter: 145 lb (65.8 kg).     Intake/Output Summary (Last 24 hours) at 7/19/2022 0756  Last data filed at 7/19/2022 0355  Gross per 24 hour   Intake 900.5 ml   Output 0 ml   Net 900.5 ml         Physical Exam:    General:    Well nourished. No overt distress  Head:  Normocephalic, atraumatic  Eyes:  Sclerae appear normal.  Pupils equally round. HENT:  Nares appear normal, no drainage. Moist mucous membranes  Neck:  No restricted ROM. Trachea midline  CV:   RRR. No JVD  Lungs:   CTAB. Respirations even, unlabored  Abdomen:   nondistended. Extremities: Warm and dry. No cyanosis or clubbing. No edema. Skin:     No rashes. Normal coloration  Neuro:  CN II-XII grossly intact. Psych:  Normal mood and affect. Signed:  Shira Ureña MD    Part of this note may have been written by using a voice dictation software. The note has been proof read but may still contain some grammatical/other typographical errors.

## 2022-07-18 NOTE — PROGRESS NOTES
Oxycodone 5mg po per request for complaint of right leg pain  rates pain 6 on 0-10 scale  assisted back to bed from chair

## 2022-07-18 NOTE — PROGRESS NOTES
Appetite not as good for supper  denies pain  transfusion near complete  no reaction  iv site without redness or edema

## 2022-07-18 NOTE — DISCHARGE INSTRUCTIONS
[facility]        Patient Discharge Instructions    Williams Gan / [id number] : 1950    Admitted 2022 Discharged: 2022     IF YOU HAVE ANY PROBLEMS ONCE YOU ARE AT HOME CALL THE FOLLOWING NUMBERS:   Main office number: [phone] ask for Shana Adriana (medical assistant with Dr. Charyl Heimlich)  Office Address: [address] [address], [address] [facility]        Weight bearing status: As tolerated with walker and assistance        Diet  Resume regular diet  If experiencing nausea and vomiting, call your doctor. Medications    The medications you are to continue on are listed on the medication reconciliation sheet. Narcotic pain medications as well as supplemental iron can cause constipation. If this occurs try stopping the narcotic pain medication and/or the iron. It is important that you take the medication exactly as they are prescribed. Keep your medication in the bottles provided by the pharmacist and keep a list of the medication names, dosages, and times to be taken in your wallet. Do not take other medications without consulting your doctor. Other Important Information    Do NOT smoke as this will greatly increase your risk of infection! Do not drive and operate hazardous machinery until being cleared to do so by your doctor     Swelling and warmth is normal for 6 months after surgery. If you experience swelling in your leg elevate you leg while laying down with your toes above your heart. If you have sudden onset severe swelling with leg pain call our office. The stitches deep inside take approximately 6 months to dissolve. There will be sharp shooting, stinging and burning pain. This is normal and will resolve between 3-6 months after surgery. Difficulty sleeping is normal following surgery. You may try melatonin, an over-the-counter sleep aid or benadryl to help with sleep. Most patients will resume sleeping through the night 5-8 weeks after surgery. Home Physical Therapy is arranged. Home Health will contact you within 48 hrs of discharge that you have chosen. If you have not received a call within this time frame please contact that provider you chose. You should be given this information before you leave the hospital.     You are at a risk for falls. Use the rolling walker when walking. Patients should not drive if they are still taking narcotic pain mediation during the daytime hours. Most patients wean themselves off of pain medication within 2-5 weeks after surgery. Please give a list of your current medications to your Primary Care Provider and update this list whenever your medications are discontinued, doses are changed, or new medications (including over-the-counter products) are added. Please carry medication information at all times in case of emergency situations. You may be retaining fluid if you have a history of heart failure or if you experience any of the following symptoms:  Weight gain of 3 pounds or more overnight or 5 pounds in a week, increased swelling in our hands or feet or shortness of breath while lying flat in bed. Please call your doctor as soon as you notice any of these symptoms; do not wait until your next office visit. If you have sleep apnea and have a CPAP machine, please use it for all naps and sleeping. When to Call the office    - If you have a temperature greater then 101  - Excessive bleeding that does not stop after holding pressure over the area  - Excessive redness, swelling or bruising, and/ or green or yellow, smelly discharge from incision  - Uncontrolled vomiting   - Loose control of your bladder or bowel function  - Need a pain medication refill   - Need to change your follow up appointment     Information obtained by :  I understand that if any problems occur once I am at home I am to contact my physician. I understand and acknowledge receipt of the instructions indicated above. Physician's or R.N.'s Signature                                                                  Date/Time                                                                                                                                              Patient or Representative Signature                                                          Date/Time

## 2022-07-18 NOTE — PROGRESS NOTES
[facility] Patient awake and in bed. A&O x4. Respirations present. On 4L NC. Right hip dressing clean, dry, and intact. No signs of distress. No needs expressed. Bed low and locked. Call light within reach. Will continue to monitor.

## 2022-07-18 NOTE — PROGRESS NOTES
Patient resting quietly in bed. Respirations even and unlabored. On 4L NC. No signs of distress. No needs expressed. To give report to oncoming RN.

## 2022-07-18 NOTE — CARE COORDINATION
MSN, CM:  patient with Hgb at 7.0 today. Patient's discharge plan is home with [facility] tomorrow per MD.  Case Management will continue to follow.

## 2022-07-18 NOTE — PROGRESS NOTES
OCCUPATIONAL THERAPY Daily Note     OT Visit Days: 3   Time  OT Charge Capture  Rehab Caseload Tracker  OT Orders    Alyssia Mackay is a 66 y.o. female   PRIMARY DIAGNOSIS: Intertrochanteric fracture of right hip, closed, initial encounter ([facility]  Closed fracture of right hip, initial encounter ([facility] [address] [facility] [S72.001A]  Intertrochanteric fracture of right hip, closed, initial encounter ([facility] [S72.141A]  Procedure(s) (LRB):  RIGHT GAMMA NAIL (Right)  3 Days Post-Op  Inpatient: Payor: Freddy Gonzáles / Plan: Ralf Jamison / Product Type: *No Product type* /     ASSESSMENT:     REHAB RECOMMENDATIONS: CURRENT LEVEL OF FUNCTION:  (Most Recently Demonstrated)   Recommendation to date pending progress:  Setting:  Home Health Therapy    Equipment:    To Be Determined Bathing:  Supervision/Setup  Dressing:  Supervision/Setup  Feeding/Grooming:  Supervision/Setup  Toileting:  Not Tested  Functional Mobility:  Contact Guard Assist     ASSESSMENT:  Ms. Junior was sitting in chair upon arrival. Pt completed functional mobility with CGA using rolling walker. Pt completed sponge bath and dressing with the assistance listed below while sitting in chair at sink. Pt was educated on AE. Continue POC. SUBJECTIVE:     Ms. Junior states, \"Hey. \"     Social/Functional Lives With: Spouse  Type of Home: House  Home Layout: One level  Home Access: Stairs to enter without rails (1 step)  Entrance Stairs - Number of Steps: 1  Bathroom Shower/Tub: Tub/Shower unit  Bathroom Toilet: Standard  Bathroom Accessibility: Accessible  Home Equipment: Neeta Ferguson  Has the patient had two or more falls in the past year or any fall with injury in the past year?: Yes  Receives Help From: Family  ADL Assistance: [facility] [address]: Independent  Homemaking Responsibilities: Yes  Meal Prep Responsibility: Primary  Laundry Responsibility: Primary  Cleaning Responsibility: Primary  Bill Paying/Finance Responsibility: Primary  Shopping Responsibility: Primary  Health Care Management: Primary  Ambulation Assistance: Independent  Transfer Assistance: Independent  Active : No  Patient's  Info: Holger -  or Dominique Montero - Son  Mode of Transportation: Truck  Occupation: Retired    OBJECTIVE:     LINES / Lily Merck / AIRWAY: IV    RESTRICTIONS/PRECAUTIONS:  Restrictions/Precautions  Restrictions/Precautions: Fall Risk  Lower Extremity Weight Bearing Restrictions  Right Lower Extremity Weight Bearing: Weight Bearing As Tolerated        PAIN: Donna Shadia / O2:   Pre Treatment: 0             Post Treatment: 0 Vitals          Oxygen            MOBILITY: I Mod I S SBA CGA Min Mod Max Total  NT x2 Comments:   Bed Mobility    Rolling [] [] [] [] [] [] [] [] [] [] []    Supine to Sit [] [] [] [] [] [] [] [] [] [] []    Scooting [] [] [] [] [] [] [] [] [] [] []    Sit to Supine [] [] [] [] [] [] [] [] [] [] []    Transfers    Sit to Stand [] [] [] [] [x] [] [] [] [] [] []    Bed to Chair [] [] [] [] [] [] [] [] [] [] []    Stand to Sit [] [] [] [] [] [] [] [] [] [] []    Tub/Shower [] [] [] [] [] [] [] [] [] [] []     Toilet [] [] [] [] [] [] [] [] [] [] []      [] [] [] [] [] [] [] [] [] [] []    I=Independent, Mod I=Modified Independent, S=Supervision/Setup, SBA=Standby Assistance, CGA=Contact Guard Assistance, Min=Minimal Assistance, Mod=Moderate Assistance, Max=Maximal Assistance, Total=Total Assistance, NT=Not Tested    ACTIVITIES OF DAILY LIVING: I Mod I S SBA CGA Min Mod Max Total NT Comments   BASIC ADLs:              Upper Body   Bathing [] [] [x] [] [] [] [] [] [] []    Lower Body Bathing [] [] [x] [] [] [] [] [] [] [] Long handled sponge    Toileting [] [] [] [] [] [] [] [] [] []    Upper Body Dressing [] [] [x] [] [] [] [] [] [] []    Lower Body Dressing [] [] [x] [] [] [] [] [] [] [] Reacher, sock aid, shoe horn    Feeding [] [] [] [] [] [] [] [] [] []    Grooming [] [] [x] [] [] [] [] [] [] []    Personal Device Care [] [] [] [] [] [] [] [] [] []    Functional Mobility [] [] [] [] [] [] [] [] [] []    I=Independent, Mod I=Modified Independent, S=Supervision/Setup, SBA=Standby Assistance, CGA=Contact Guard Assistance, Min=Minimal Assistance, Mod=Moderate Assistance, Max=Maximal Assistance, Total=Total Assistance, NT=Not Tested    BALANCE: Good Fair+ Fair Fair- Poor NT Comments   Sitting Static [] [] [] [] [] []    Sitting Dynamic [] [] [] [] [] []              Standing Static [] [] [] [] [] []    Standing Dynamic [] [] [] [] [] []        PLAN:     FREQUENCY/DURATION   OT Plan of Care: 6 times/week for duration of hospital stay or until stated goals are met, whichever comes first.    ACUTE OCCUPATIONAL THERAPY GOALS:   (Developed with and agreed upon by patient and/or caregiver.)  1. Patient will complete functional transfers with mod I and adaptive equipment as needed. 2. Patient will complete grooming tasks in standing at sink level. 3. Patient will complete lower body bathing and dressing with mod I and adaptive equipment as needed. 4. Patient will complete toileting with mod I.  5. Patient will tolerate at least 23 minutes of BUE therapeutic exercises to increase strength in BUE to aid in functional transfers. 6. Patient will tolerate at least 10 minutes of OT treatment with no rest breaks to increase activity tolerance for ADLs. TREATMENT:     TREATMENT:   Self Care (30 minutes): Patient participated in upper body bathing, lower body bathing, upper body dressing, lower body dressing, and grooming ADLs in supported sitting and standing with minimal verbal cueing to increase independence and decrease assistance required. Patient also participated in functional mobility, energy conservation, and adaptive equipment training to increase independence and decrease assistance required.      TREATMENT GRID:  Alayna Pacheco lazarusgraciela glen  Date:  7/17/22 Date:   Date:     Activity/Exercise Parameters Parameters Parameters Shoulders flex/ex  10 reps      Punches  10 reps      Shoulder hor abd/add  10 reps                                  AFTER TREATMENT PRECAUTIONS: Bed/Chair Locked, Call light within reach, Chair, Needs within reach, and RN notified    INTERDISCIPLINARY COLLABORATION:  RN/ PCT and OT/ BARAJAS    EDUCATION:       TOTAL TREATMENT DURATION AND TIME:  Time In: 0900  Time Out: 0930  Minutes: JUAN CARLOS Garcia

## 2022-07-18 NOTE — PROGRESS NOTES
[facility]        2022         Post Op day: 3 Days Post-Op Procedure(s) (LRB):  RIGHT GAMMA NAIL (Right)      Admit Date: 2022  Admit Diagnosis: Closed fracture of right hip, initial encounter ([facility] [S72.001A]  Intertrochanteric fracture of right hip, closed, initial encounter ([facility] [S72.141A]       Principle Problem: Intertrochanteric fracture of right hip, closed, initial encounter ([facility] Subjective: Doing well, No complaints, No SOB, No Chest Pain, No Nausea or Vomiting     Objective:   Vital Signs are Stable, No Acute Distress, Alert,  Dressing is Dry,  Neurovascular exam is normal.     Assessment / Plan :  Patient Active Problem List   Diagnosis    Intertrochanteric fracture of right hip, closed, initial encounter ([facility]    HTN (hypertension)    HLD (hyperlipidemia)    Stage 3a chronic kidney disease (HCC)    Gout    Postoperative anemia due to acute blood loss    Patient Vitals for the past 8 hrs:   BP Temp Temp src Pulse Resp SpO2   22 0745 (!) 105/52 98.3 °F (36.8 °C) Oral 74 18 100 %   22 0542 (!) 107/52 98.1 °F (36.7 °C) Oral 73 16 100 %    Temp (24hrs), Av.7 °F (37.1 °C), Min:98.1 °F (36.7 °C), Max:99.5 °F (37.5 °C)    Body mass index is 24.89 kg/m².     Lab Results   Component Value Date/Time    HGB 7.0 2022 04:28 AM          S/P Procedure(s) (LRB):  RIGHT GAMMA NAIL (Right)      Medical Mgmt per hospitalist  Anticoagulation plan: ASA 325mg daily  Continue PT  Fall Precautions  DC disp: home health PT when cleared   F/U: 2 weeks postop for wound check and staple removal        Signed By: AIDA Magallon, PA  2022,  8:38 AM

## 2022-07-18 NOTE — PROGRESS NOTES
Hospitalist Progress Note   Admit Date:  2022 10:25 AM   Name:  Deshaun Oakes   Age:  66 y.o. Sex:  female  :  1950   MRN:  [id number]   Room:  [facility]/    Presenting Complaint: Hip Pain     Reason(s) for Admission: Closed fracture of right hip, initial encounter [facility]) [S72.001A]  Intertrochanteric fracture of right hip, closed, initial encounter [facility]) [facility] Course & Interval History:   Patient is a 66 y.o. female with hx of  HTN, HLD, and CKD stage 3,who presented to the ED for cc right hip pain after a fall unable to bear weight. admitted for R hip fracture. Subjective/24hr Events (22): Pt feeling well. No complaints. Assessment & Plan:       Intertrochanteric fracture of right hip, closed, initial encounter ([facility] [address]  [facility]  -ASA dvt ppx      ABLA due to operative blood loss  22 -transfuse 1u RBC today due to significant blood loss. Recheck hb in AM  -iron studies with no deficiency. Daily CBC      HTN (hypertension)  -cont metoprolol, norvasc      HLD (hyperlipidemia)  -cont lipitor      Stage 3a chronic kidney disease (HCC)  -stable. Diet:  ADULT DIET; Regular  DVT PPx: per ortho  Code status: Full Code    Hospital Problems:  Principal Problem:    Intertrochanteric fracture of right hip, closed, initial encounter ([facility]  Active Problems:    HTN (hypertension)    HLD (hyperlipidemia)    Stage 3a chronic kidney disease (HCC)    Postoperative anemia due to acute blood loss  Resolved Problems:    * No resolved hospital problems.  *      Objective:     Patient Vitals for the past 24 hrs:   Temp Pulse Resp BP SpO2   22 1130 97.9 °F (36.6 °C) 71 18 (!) 94/52 93 %   22 0745 98.3 °F (36.8 °C) 74 18 (!) 105/52 100 %   22 0542 98.1 °F (36.7 °C) 73 16 (!) 107/52 100 %   22 2321 99.5 °F (37.5 °C) 75 16 (!) 103/53 98 %   22 1943 98.4 °F (36.9 °C) 85 22 (!) 127/55 100 %   22 1523 98.8 °F (37.1 °C) 74 18 (!) 115/51 100 %         Oxygen Therapy  SpO2: 93 %  Pulse Oximetry Type: Continuous  Pulse via Oximetry: 67 beats per minute  Pulse Oximeter Device Mode: Intermittent  Pulse Oximeter Device Location: Right, Finger  O2 Device: Nasal cannula  Skin Assessment: Clean, dry, & intact  Skin Protection for O2 Device: No  O2 Flow Rate (L/min): 4 L/min  Oxygen Therapy: None (Room air)    Estimated body mass index is 24.89 kg/m² as calculated from the following:    Height as of this encounter: 5' 4\" (1.626 m). Weight as of this encounter: 145 lb (65.8 kg). No intake or output data in the 24 hours ending 07/18/22 1341        Physical Exam:     Blood pressure (!) 94/52, pulse 71, temperature 97.9 °F (36.6 °C), temperature source Oral, resp. rate 18, height 5' 4\" (1.626 m), weight 145 lb (65.8 kg), SpO2 93 %. General:    Well nourished. Head:  Normocephalic, atraumatic  Eyes:  Sclerae appear normal.  Pupils equally round. ENT:  Nares appear normal, no drainage. Moist oral mucosa  Neck:  No restricted ROM. Trachea midline   CV:   RRR. No jugular venous distension. Lungs:   Symmetric expansion. Abdomen:   nondistended. Extremities: No cyanosis or clubbing. No edema  Skin:     No rashes and normal coloration. Warm and dry. Neuro:  CN II-XII grossly intact. Sensation intact. A&Ox3  Psych:  Normal mood and affect.       I have reviewed ordered lab tests and independently visualized imaging below:    Recent Labs:  Recent Results (from the past 48 hour(s))   CBC    Collection Time: 07/17/22  4:25 AM   Result Value Ref Range    WBC 4.8 4.3 - 11.1 K/uL    RBC 2.34 (L) 4.05 - 5.2 M/uL    Hemoglobin 7.1 (L) 11.7 - 15.4 g/dL    Hematocrit 22.2 (L) 35.8 - 46.3 %    MCV 94.9 79.6 - 97.8 FL    MCH 30.3 26.1 - 32.9 PG    MCHC 32.0 31.4 - 35.0 g/dL    RDW 13.5 11.9 - 14.6 %    Platelets 84 (L) 260 - 450 K/uL    MPV 12.0 9.4 - 12.3 FL    nRBC 0.00 0.0 - 0.2 K/uL   Basic Metabolic Panel w/ Reflex to MG Collection Time: 07/17/22  4:25 AM   Result Value Ref Range    Sodium 139 136 - 145 mmol/L    Potassium 4.2 3.5 - 5.1 mmol/L    Chloride 107 98 - 107 mmol/L    CO2 30 21 - 32 mmol/L    Anion Gap 2 (L) 7 - 16 mmol/L    Glucose 100 65 - 100 mg/dL    BUN 50 (H) 8 - 23 MG/DL    CREATININE 1.30 (H) 0.6 - 1.0 MG/DL    GFR African American 52 (L) >60 ml/min/1.73m2    GFR Non- 43 (L) >60 ml/min/1.73m2    Calcium 7.8 (L) 8.3 - 10.4 MG/DL   CBC    Collection Time: 07/18/22  4:28 AM   Result Value Ref Range    WBC 4.3 4.3 - 11.1 K/uL    RBC 2.33 (L) 4.05 - 5.2 M/uL    Hemoglobin 7.0 (L) 11.7 - 15.4 g/dL    Hematocrit 22.2 (L) 35.8 - 46.3 %    MCV 95.3 79.6 - 97.8 FL    MCH 30.0 26.1 - 32.9 PG    MCHC 31.5 31.4 - 35.0 g/dL    RDW 13.6 11.9 - 14.6 %    Platelets 97 (L) 431 - 450 K/uL    MPV 12.2 9.4 - 12.3 FL    nRBC 0.00 0.0 - 0.2 K/uL   Basic Metabolic Panel w/ Reflex to MG    Collection Time: 07/18/22  4:28 AM   Result Value Ref Range    Sodium 140 136 - 145 mmol/L    Potassium 4.3 3.5 - 5.1 mmol/L    Chloride 108 (H) 98 - 107 mmol/L    CO2 26 21 - 32 mmol/L    Anion Gap 6 (L) 7 - 16 mmol/L    Glucose 97 65 - 100 mg/dL    BUN 49 (H) 8 - 23 MG/DL    CREATININE 1.30 (H) 0.6 - 1.0 MG/DL    GFR African American 52 (L) >60 ml/min/1.73m2    GFR Non- 43 (L) >60 ml/min/1.73m2    Calcium 8.2 (L) 8.3 - 10.4 MG/DL       Other Studies:  XR HIP RIGHT (2-3 VIEWS)   Final Result   FINDINGS / IMPRESSION: Nail through the femoral neck and long intramedullary kevin   have been placed reducing and transfixing the intertrochanteric fracture. NC XR TECHNOLOGIST SERVICE   Final Result      XR HIP RIGHT (2-3 VIEWS)   Final Result      1. Comminuted right intertrochanteric femoral neck fracture. XR FEMUR RIGHT (MIN 2 VIEWS)   Final Result      1. Intertrochanteric right femoral neck fracture. XR CHEST PORTABLE   Final Result      1. No acute cardiopulmonary process.       CPT code(s) 07731                      Current Meds:  Current Facility-Administered Medications   Medication Dose Route Frequency    0.9 % sodium chloride infusion   IntraVENous PRN    medicated lip ointment (BLISTEX)   Topical PRN    sodium chloride flush 0.9 % injection 5-40 mL  5-40 mL IntraVENous 2 times per day    sodium chloride flush 0.9 % injection 5-40 mL  5-40 mL IntraVENous PRN    0.9 % sodium chloride infusion   IntraVENous PRN    ondansetron (ZOFRAN-ODT) disintegrating tablet 4 mg  4 mg Oral Q8H PRN    Or    ondansetron (ZOFRAN) injection 4 mg  4 mg IntraVENous Q6H PRN    aspirin EC tablet 325 mg  325 mg Oral Daily    atorvastatin (LIPITOR) tablet 40 mg  40 mg Oral Nightly    [Held by provider] amLODIPine (NORVASC) tablet 10 mg  10 mg Oral Nightly    metoprolol tartrate (LOPRESSOR) tablet 75 mg  75 mg Oral BID    sodium chloride flush 0.9 % injection 5-40 mL  5-40 mL IntraVENous 2 times per day    ondansetron (ZOFRAN-ODT) disintegrating tablet 4 mg  4 mg Oral Q8H PRN    Or    ondansetron (ZOFRAN) injection 4 mg  4 mg IntraVENous Q6H PRN    polyethylene glycol (GLYCOLAX) packet 17 g  17 g Oral Daily PRN    acetaminophen (TYLENOL) tablet 650 mg  650 mg Oral Q6H PRN    Or    acetaminophen (TYLENOL) suppository 650 mg  650 mg Rectal Q6H PRN    oxyCODONE (ROXICODONE) immediate release tablet 5 mg  5 mg Oral Q4H PRN       Signed:  Javier Alonso MD    Part of this note may have been written by using a voice dictation software. The note has been proof read but may still contain some grammatical/other typographical errors.

## 2022-07-18 NOTE — PROGRESS NOTES
Oxygen sat is 85% on room air  patient is sleepy after oxycodone  arouses easily  3l to get sat above 90%

## 2022-07-18 NOTE — PROGRESS NOTES
Patient is up and much more alert  sat is 92% on room air now  tolerating transfusion well  right leg dressing dry and intact  pain controlled

## 2022-07-18 NOTE — PROGRESS NOTES
PHYSICAL THERAPY Daily Note and AM  (Link to Caseload Tracking: PT Visit Days : 3  Time In/Out PT Charge Capture  Rehab Caseload Tracker  Orders      Hussain Leyva is a 66 y.o. female   PRIMARY DIAGNOSIS: Intertrochanteric fracture of right hip, closed, initial encounter ([facility]  Closed fracture of right hip, initial encounter ([facility] [S72.001A]  Intertrochanteric fracture of right hip, closed, initial encounter ([facility] [S72.141A]  Procedure(s) (LRB):  RIGHT GAMMA NAIL (Right)  3 Days Post-Op  Inpatient: Payor: Gautam Rear / Plan: Norberto Splinter / Product Type: *No Product type* /     ASSESSMENT:     REHAB RECOMMENDATIONS:   Recommendation to date pending progress:  Setting:  Home Health Therapy    Equipment:    To Be Determined     ASSESSMENT:  Ms. Junior presents in recliner and ready to participate. She performed seated exercises below with good ability. She was able to walk the [place] in the hallway with SBA and cues for more fluid gait pattern. She is doing excellent and ready to go home. SUBJECTIVE:   Ms. Junior states, \"ok\"     Social/Functional Lives With: Spouse  Type of Home: House  Home Layout: One level  Home Access: Stairs to enter without rails (1 step)  Entrance Stairs - Number of Steps: 1  Bathroom Shower/Tub: Tub/Shower unit  Bathroom Toilet: Standard  Bathroom Accessibility: Accessible  Home Equipment: Amara Serum, Wheelchair-manual  Has the patient had two or more falls in the past year or any fall with injury in the past year?: Yes  Receives Help From: Family  ADL Assistance: [facility] [address]: Independent  Homemaking Responsibilities: Yes  Meal Prep Responsibility: Primary  Laundry Responsibility: Primary  Cleaning Responsibility: Primary  Bill Paying/Finance Responsibility: Primary  Shopping Responsibility: Primary  Health Care Management: Primary  Ambulation Assistance: Independent  Transfer Assistance: Independent  Active : No  Patient's  Info: Edwina Herrera - Ben or Jose Juan Miranda - Son  Mode of Transportation: Truck  Occupation: Retired  OBJECTIVE:     PAIN: VITALS / O2: PRECAUTION / Andres Esquivel / Luther Mchugh:   Pre Treatment:  2/10         Post Treatment: 2/10 Vitals        Oxygen    None    RESTRICTIONS/PRECAUTIONS:  Restrictions/Precautions  Restrictions/Precautions: Fall Risk  Lower Extremity Weight Bearing Restrictions  Right Lower Extremity Weight Bearing: Weight Bearing As Tolerated  Restrictions/Precautions: Fall Risk     MOBILITY: I Mod I S SBA CGA Min Mod Max Total  NT x2 Comments:   Bed Mobility    Rolling [] [] [] [] [] [] [] [] [] [] []    Supine to Sit [] [] [] [] [] [] [] [] [] [] []    Scooting [] [] [] [] [] [] [] [] [] [] []    Sit to Supine [] [] [] [] [] [] [] [] [] [x] []    Transfers    Sit to Stand [] [] [] [] [x] [] [] [] [] [] []    Bed to Chair [] [] [] [] [x] [] [] [] [] [] []    Stand to Sit [] [] [] [] [x] [] [] [] [] [] []     [] [] [] [] [] [] [] [] [] [] []    I=Independent, Mod I=Modified Independent, S=Supervision, SBA=Standby Assistance, CGA=Contact Guard Assistance,   Min=Minimal Assistance, Mod=Moderate Assistance, Max=Maximal Assistance, Total=Total Assistance, NT=Not Tested    BALANCE: Good Fair+ Fair Fair- Poor NT Comments   Sitting Static [x] [] [] [] [] []    Sitting Dynamic [x] [] [] [] [] []              Standing Static [] [x] [] [] [] []    Standing Dynamic [] [x] [] [] [] []      GAIT: I Mod I S SBA CGA Min Mod Max Total  NT x2 Comments:   Level of Assistance [] [] [] [x] [] [] [] [] [] [] []    Distance 250 feet    DME Rolling Walker    Gait Quality Antalgic    Weightbearing Status      Stairs      I=Independent, Mod I=Modified Independent, S=Supervision, SBA=Standby Assistance, CGA=Contact Guard Assistance,   Min=Minimal Assistance, Mod=Moderate Assistance, Max=Maximal Assistance, Total=Total Assistance, NT=Not Tested    PLAN:   ACUTE PHYSICAL THERAPY GOALS:   (Developed with and agreed upon by patient and/or caregiver.)  STG:  (1.)Ms. Junior will move from supine to sit and sit to supine , scoot up and down, and roll side to side with STAND BY ASSIST within 4 treatment day(s). (2.)Ms. Junior will transfer from bed to chair and chair to bed with STAND BY ASSIST using the least restrictive device within 4 treatment day(s). (3.)Ms. Junior will ambulate with STAND BY ASSIST for 100 feet with the least restrictive device within 4 treatment day(s). LTG:  (1.)Ms. Junior will move from supine to sit and sit to supine , scoot up and down, and roll side to side in bed with MODIFIED INDEPENDENCE within 7 treatment day(s). (2.)Ms. Junior will transfer from bed to chair and chair to bed with MODIFIED INDEPENDENCE using the least restrictive device within 7 treatment day(s). (3.)Ms. Junior will ambulate with MODIFIED INDEPENDENCE for 250 feet with the least restrictive device within 7 treatment day(s). FREQUENCY AND DURATION: BID for duration of hospital stay or until stated goals are met, whichever comes first.    TREATMENT:   TREATMENT:   Therapeutic Activity (15 Minutes): Therapeutic activity included Scooting, Transfer Training, Ambulation on level ground, and Standing balance to improve functional Activity tolerance, Balance, Coordination, Mobility, and Strength. Therapeutic Exercise (10 Minutes): Therapeutic exercises noted below to improve functional activity tolerance, AROM, strength, and mobility.      TREATMENT GRID:   Date:  7/18/22 Date:   Date:     Activity/Exercise Parameters Parameters Parameters   Seated TKE 15x B     Seated marching 15x B     Seated hip abd 15x B                                 AFTER TREATMENT PRECAUTIONS: Bed/Chair Locked, Call light within reach, Chair, Needs within reach, and RN notified    INTERDISCIPLINARY COLLABORATION:  RN/ PCT and PT/ PTA    EDUCATION:      TIME IN/OUT:  Time In: 1010  Time Out: 1035  Minutes: 25    Mel Orozco PTA

## 2022-07-19 VITALS
RESPIRATION RATE: 16 BRPM | BODY MASS INDEX: 24.75 KG/M2 | OXYGEN SATURATION: 94 % | HEIGHT: 64 IN | TEMPERATURE: 98.8 F | HEART RATE: 84 BPM | DIASTOLIC BLOOD PRESSURE: 70 MMHG | WEIGHT: 145 LBS | SYSTOLIC BLOOD PRESSURE: 117 MMHG

## 2022-07-19 LAB
ABO + RH BLD: NORMAL
BLD PROD TYP BPU: NORMAL
BLOOD BANK DISPENSE STATUS: NORMAL
BLOOD GROUP ANTIBODIES SERPL: NORMAL
BPU ID: NORMAL
CROSSMATCH RESULT: NORMAL
HGB BLD-MCNC: 8.4 G/DL (ref 11.7–15.4)
SPECIMEN EXP DATE BLD: NORMAL
UNIT DIVISION: 0

## 2022-07-19 PROCEDURE — 2580000003 HC RX 258: Performed by: ORTHOPAEDIC SURGERY

## 2022-07-19 PROCEDURE — 6370000000 HC RX 637 (ALT 250 FOR IP): Performed by: ORTHOPAEDIC SURGERY

## 2022-07-19 PROCEDURE — 36415 COLL VENOUS BLD VENIPUNCTURE: CPT

## 2022-07-19 PROCEDURE — 94760 N-INVAS EAR/PLS OXIMETRY 1: CPT

## 2022-07-19 PROCEDURE — 97530 THERAPEUTIC ACTIVITIES: CPT

## 2022-07-19 PROCEDURE — 97110 THERAPEUTIC EXERCISES: CPT

## 2022-07-19 PROCEDURE — 85018 HEMOGLOBIN: CPT

## 2022-07-19 RX ADMIN — SODIUM CHLORIDE, PRESERVATIVE FREE 10 ML: 5 INJECTION INTRAVENOUS at 09:04

## 2022-07-19 RX ADMIN — ASPIRIN 325 MG: 325 TABLET, COATED ORAL at 09:01

## 2022-07-19 RX ADMIN — METOPROLOL TARTRATE 75 MG: 25 TABLET, FILM COATED ORAL at 09:01

## 2022-07-19 ASSESSMENT — PAIN SCALES - GENERAL: PAINLEVEL_OUTOF10: 0

## 2022-07-19 NOTE — PROGRESS NOTES
PHYSICAL THERAPY Daily Note and AM  (Link to Caseload Tracking: PT Visit Days : 4  Time In/Out PT Charge Capture  Rehab Caseload Tracker  Orders      Deshaun Oakes is a 66 y.o. female   PRIMARY DIAGNOSIS: Intertrochanteric fracture of right hip, closed, initial encounter ([facility] [address]  Closed fracture of right hip, initial encounter ([facility] [address] [S72.001A]  Intertrochanteric fracture of right hip, closed, initial encounter ([facility] [address] [S72.141A]  Procedure(s) (LRB):  RIGHT GAMMA NAIL (Right)  4 Days Post-Op  Inpatient: Payor: Jose Thomas / Plan: Tejinder Chimera / Product Type: *No Product type* /     ASSESSMENT:     REHAB RECOMMENDATIONS:   Recommendation to date pending progress:  Setting:  Home Health Therapy    Equipment:    3 in 1 Bedside Commode  Rolling Walker     ASSESSMENT:  Ms. Junior presents in recliner and ready to participate. She performed seated exercises below with good ability. She again walked the [place] in the hallway with good ability. Ready to go home       SUBJECTIVE:   Ms. Junior states, \"ok\"     Social/Functional Lives With: Spouse  Type of Home: House  Home Layout: One level  Home Access: Stairs to enter without rails (1 step)  Entrance Stairs - Number of Steps: 1  Bathroom Shower/Tub: Tub/Shower unit  Bathroom Toilet: Standard  Bathroom Accessibility: Accessible  Home Equipment: Ligia Stanley  Has the patient had two or more falls in the past year or any fall with injury in the past year?: Yes  Receives Help From: Family  ADL Assistance: [facility] [address]: Independent  Homemaking Responsibilities: Yes  Meal Prep Responsibility: Primary  Laundry Responsibility: Primary  Cleaning Responsibility: Primary  Bill Paying/Finance Responsibility: Primary  Shopping Responsibility: Primary  Health Care Management: Primary  Ambulation Assistance: Independent  Transfer Assistance: Independent  Active : No  Patient's  Info: John Lara -  or Justina Scott - Son  Mode of Transportation: Truck  Occupation: Retired  OBJECTIVE:     PAIN: VITALS / O2: PRECAUTION / Jeff Money / Yennifer Domínguezr:   Pre Treatment:  2/10         Post Treatment: 2/10 Vitals        Oxygen    None    RESTRICTIONS/PRECAUTIONS:  Restrictions/Precautions  Restrictions/Precautions: Fall Risk  Lower Extremity Weight Bearing Restrictions  Right Lower Extremity Weight Bearing: Weight Bearing As Tolerated  Restrictions/Precautions: Fall Risk     MOBILITY: I Mod I S SBA CGA Min Mod Max Total  NT x2 Comments:   Bed Mobility    Rolling [] [] [] [] [] [] [] [] [] [] []    Supine to Sit [] [] [] [] [] [] [] [] [] [] []    Scooting [] [] [] [] [] [] [] [] [] [] []    Sit to Supine [] [] [] [] [] [] [] [] [] [x] []    Transfers    Sit to Stand [] [x] [] [] [] [] [] [] [] [] []    Bed to Chair [] [] [x] [] [] [] [] [] [] [] []    Stand to Sit [] [x] [] [] [] [] [] [] [] [] []     [] [] [] [] [] [] [] [] [] [] []    I=Independent, Mod I=Modified Independent, S=Supervision, SBA=Standby Assistance, CGA=Contact Guard Assistance,   Min=Minimal Assistance, Mod=Moderate Assistance, Max=Maximal Assistance, Total=Total Assistance, NT=Not Tested    BALANCE: Good Fair+ Fair Fair- Poor NT Comments   Sitting Static [x] [] [] [] [] []    Sitting Dynamic [x] [] [] [] [] []              Standing Static [] [x] [] [] [] []    Standing Dynamic [] [x] [] [] [] []      GAIT: I Mod I S SBA CGA Min Mod Max Total  NT x2 Comments:   Level of Assistance [] [x] [x] [] [] [] [] [] [] [] []    Distance 250 feet    DME Rolling Walker    Gait Quality Antalgic    Weightbearing Status      Stairs      I=Independent, Mod I=Modified Independent, S=Supervision, SBA=Standby Assistance, CGA=Contact Guard Assistance,   Min=Minimal Assistance, Mod=Moderate Assistance, Max=Maximal Assistance, Total=Total Assistance, NT=Not Tested    PLAN:   ACUTE PHYSICAL THERAPY GOALS:   (Developed with and agreed upon by patient and/or caregiver.)  STG:  (1.)Ms. Junior will move

## 2022-07-19 NOTE — PLAN OF CARE
Problem: Pain  Goal: Verbalizes/displays adequate comfort level or baseline comfort level  Outcome: Progressing Towards Goal  Flowsheets  Taken 7/19/2022 9412 by Ruby Parkinson RN  Verbalizes/displays adequate comfort level or baseline comfort level: Encourage patient to monitor pain and request assistance  Taken 7/19/2022 0355 by Rea Santiago RN  Verbalizes/displays adequate comfort level or baseline comfort level:   Encourage patient to monitor pain and request assistance   Assess pain using appropriate pain scale   Administer analgesics based on type and severity of pain and evaluate response   Implement non-pharmacological measures as appropriate and evaluate response   Consider cultural and social influences on pain and pain management   Notify Licensed Independent Practitioner if interventions unsuccessful or patient reports new pain     Problem: Safety - Adult  Goal: Free from fall injury  Recent Flowsheet Documentation  Taken 7/19/2022 9656 by Ruby Parkinson RN  Free From Fall Injury: Instruct family/caregiver on patient safety  Taken 7/19/2022 0355 by Rea Santiago RN  Free From Fall Injury: Instruct family/caregiver on patient safety     Problem: ABCDS Injury Assessment  Goal: Absence of physical injury  Recent Flowsheet Documentation  Taken 7/19/2022 0712 by Kathya Mcdermott RN  Absence of Physical Injury: Implement safety measures based on patient assessment

## 2022-08-02 ENCOUNTER — OFFICE VISIT (OUTPATIENT)
Dept: ORTHOPEDIC SURGERY | Age: 72
End: 2022-08-02

## 2022-08-02 DIAGNOSIS — S72.141A INTERTROCHANTERIC FRACTURE OF RIGHT HIP, CLOSED, INITIAL ENCOUNTER (HCC): Primary | ICD-10-CM

## 2022-08-02 PROCEDURE — 99024 POSTOP FOLLOW-UP VISIT: CPT | Performed by: PHYSICIAN ASSISTANT

## 2022-08-02 NOTE — PROGRESS NOTES
[facility]            Patient ID:  Name: Doreen Lacy  AGE/Gender: 66 y.o. female  MRN: [id number]  : 1950    Date of Service: 2022          ALLERGIES:   Allergies   Allergen Reactions    Allopurinol Shortness Of Breath    Colchicine Rash     Rash and shortness of breath    Red Dye Itching    Yellow Dyes (Non-Tartrazine) Itching          History:  The patient is seen today for follow-up. The patient sustained  a right Hip fracture and underwent ORIF at [facility].  They are doing well with regard to the hip,  having very little discomfort or pain. They have no other complaints or concerns: The patient has been progressing with physical therapy. Physical Exam:       General:  On exam the patient is a pleasant 66 y.o. female in no acute distress, A&O x 3. Hip: This incision is healing there is swelling consistent with the postoperative time frame. There is no drainage. ROM not assessed. The calf is soft and non-tender. Assessment and Plan:   The incision is healing. I removed the staples and applied steri strips. OK to bathe. WBAT with walker. The patient was advised to notify us if drainage returns. We will follow up in 4 weeks or sooner if needed.        Electronically signed by:   AIDA Cavanaugh, PA  2022,  11:05 AM

## 2022-09-01 ENCOUNTER — OFFICE VISIT (OUTPATIENT)
Dept: ORTHOPEDIC SURGERY | Age: 72
End: 2022-09-01
Payer: MEDICARE

## 2022-09-01 DIAGNOSIS — S72.141A INTERTROCHANTERIC FRACTURE OF RIGHT HIP, CLOSED, INITIAL ENCOUNTER (HCC): Primary | ICD-10-CM

## 2022-09-01 PROCEDURE — 1090F PRES/ABSN URINE INCON ASSESS: CPT | Performed by: ORTHOPAEDIC SURGERY

## 2022-09-01 PROCEDURE — G8400 PT W/DXA NO RESULTS DOC: HCPCS | Performed by: ORTHOPAEDIC SURGERY

## 2022-09-01 PROCEDURE — 3017F COLORECTAL CA SCREEN DOC REV: CPT | Performed by: ORTHOPAEDIC SURGERY

## 2022-09-01 PROCEDURE — 4004F PT TOBACCO SCREEN RCVD TLK: CPT | Performed by: ORTHOPAEDIC SURGERY

## 2022-09-01 PROCEDURE — G8420 CALC BMI NORM PARAMETERS: HCPCS | Performed by: ORTHOPAEDIC SURGERY

## 2022-09-01 PROCEDURE — 1123F ACP DISCUSS/DSCN MKR DOCD: CPT | Performed by: ORTHOPAEDIC SURGERY

## 2022-09-01 PROCEDURE — G8428 CUR MEDS NOT DOCUMENT: HCPCS | Performed by: ORTHOPAEDIC SURGERY

## 2022-09-01 PROCEDURE — 99212 OFFICE O/P EST SF 10 MIN: CPT | Performed by: ORTHOPAEDIC SURGERY

## (undated) DEVICE — 3M™ IOBAN™ 2 ANTIMICROBIAL INCISE DRAPE 6650EZ: Brand: IOBAN™ 2

## (undated) DEVICE — SHEET, DRAPE, SPLIT, STERILE: Brand: MEDLINE

## (undated) DEVICE — KIT ARMOR C DRP COLLAPSIBLE AND SELF EXP TOP CVR FOR FLUOROSCOPIC

## (undated) DEVICE — SUTURE MCRYL SZ 2-0 L27IN ABSRB UD SH L26MM TAPERPOINT NDL Y417H

## (undated) DEVICE — PAD,ABDOMINAL,5"X9",ST,LF,25/BX: Brand: MEDLINE INDUSTRIES, INC.

## (undated) DEVICE — REAMER SHAFT, MOD.TRINKLE: Brand: BIXCUT

## (undated) DEVICE — 7 DAY SILVER-COATED ANTIMICROBIAL BARRIER DRESSING: Brand: ACTICOAT 7  4" X 5"

## (undated) DEVICE — SOLUTION IRRIG 1000ML 09% SOD CHL USP PIC PLAS CONTAINER

## (undated) DEVICE — SHEET,DRAPE,53X77,STERILE: Brand: MEDLINE

## (undated) DEVICE — GLOVE SURG SZ 7 L12IN FNGR THK79MIL GRN LTX FREE

## (undated) DEVICE — GUIDE WIRE, BALL-TIPPED, STERILE

## (undated) DEVICE — TFN: Brand: MEDLINE INDUSTRIES, INC.

## (undated) DEVICE — DRILL, AO, STERILE

## (undated) DEVICE — GLOVE SURG SZ 8 L12IN FNGR THK79MIL GRN LTX FREE

## (undated) DEVICE — K-WIRE

## (undated) DEVICE — DRAPE PT ISOLATN 130 IN X 96 IN

## (undated) DEVICE — DRAPE,U/SHT,SPLIT,FILM,60X84,STERILE: Brand: MEDLINE

## (undated) DEVICE — INTENDED FOR TISSUE SEPARATION, AND OTHER PROCEDURES THAT REQUIRE A SHARP SURGICAL BLADE TO PUNCTURE OR CUT.: Brand: BARD-PARKER ® STAINLESS STEEL BLADES